# Patient Record
Sex: FEMALE | Race: WHITE | NOT HISPANIC OR LATINO | Employment: UNEMPLOYED | URBAN - METROPOLITAN AREA
[De-identification: names, ages, dates, MRNs, and addresses within clinical notes are randomized per-mention and may not be internally consistent; named-entity substitution may affect disease eponyms.]

---

## 2024-11-18 ENCOUNTER — APPOINTMENT (OUTPATIENT)
Dept: CT IMAGING | Facility: HOSPITAL | Age: 59
End: 2024-11-18
Payer: COMMERCIAL

## 2024-11-18 ENCOUNTER — HOSPITAL ENCOUNTER (OUTPATIENT)
Facility: HOSPITAL | Age: 59
Setting detail: OBSERVATION
Discharge: HOME | End: 2024-11-20
Attending: EMERGENCY MEDICINE | Admitting: INTERNAL MEDICINE
Payer: COMMERCIAL

## 2024-11-18 DIAGNOSIS — G44.201 ACUTE INTRACTABLE TENSION-TYPE HEADACHE: ICD-10-CM

## 2024-11-18 DIAGNOSIS — R11.2 NAUSEA AND VOMITING IN ADULT: ICD-10-CM

## 2024-11-18 DIAGNOSIS — E87.20 LACTIC ACIDEMIA: ICD-10-CM

## 2024-11-18 DIAGNOSIS — R00.0 SINUS TACHYCARDIA: Primary | ICD-10-CM

## 2024-11-18 LAB
ALBUMIN SERPL-MCNC: 4.8 G/DL (ref 3.5–5.2)
ALBUMIN/GLOB SERPL: 1.1 G/DL
ALP SERPL-CCNC: 175 U/L (ref 39–117)
ALT SERPL W P-5'-P-CCNC: 40 U/L (ref 1–33)
ANION GAP SERPL CALCULATED.3IONS-SCNC: 17 MMOL/L (ref 5–15)
AST SERPL-CCNC: 48 U/L (ref 1–32)
BASOPHILS # BLD AUTO: 0.04 10*3/MM3 (ref 0–0.2)
BASOPHILS NFR BLD AUTO: 0.2 % (ref 0–1.5)
BILIRUB SERPL-MCNC: 0.8 MG/DL (ref 0–1.2)
BUN SERPL-MCNC: 8 MG/DL (ref 6–20)
BUN/CREAT SERPL: 9.6 (ref 7–25)
CALCIUM SPEC-SCNC: 9.9 MG/DL (ref 8.6–10.5)
CHLORIDE SERPL-SCNC: 102 MMOL/L (ref 98–107)
CO2 SERPL-SCNC: 18 MMOL/L (ref 22–29)
CREAT SERPL-MCNC: 0.83 MG/DL (ref 0.57–1)
D-LACTATE SERPL-SCNC: 2.8 MMOL/L (ref 0.5–2)
DEPRECATED RDW RBC AUTO: 39.8 FL (ref 37–54)
EGFRCR SERPLBLD CKD-EPI 2021: 81.8 ML/MIN/1.73
EOSINOPHIL # BLD AUTO: 0 10*3/MM3 (ref 0–0.4)
EOSINOPHIL NFR BLD AUTO: 0 % (ref 0.3–6.2)
ERYTHROCYTE [DISTWIDTH] IN BLOOD BY AUTOMATED COUNT: 12.1 % (ref 12.3–15.4)
GLOBULIN UR ELPH-MCNC: 4.5 GM/DL
GLUCOSE SERPL-MCNC: 208 MG/DL (ref 65–99)
HCT VFR BLD AUTO: 48.8 % (ref 34–46.6)
HGB BLD-MCNC: 16.8 G/DL (ref 12–15.9)
HOLD SPECIMEN: NORMAL
IMM GRANULOCYTES # BLD AUTO: 0.08 10*3/MM3 (ref 0–0.05)
IMM GRANULOCYTES NFR BLD AUTO: 0.4 % (ref 0–0.5)
LIPASE SERPL-CCNC: 10 U/L (ref 13–60)
LYMPHOCYTES # BLD AUTO: 1.35 10*3/MM3 (ref 0.7–3.1)
LYMPHOCYTES NFR BLD AUTO: 7.3 % (ref 19.6–45.3)
MCH RBC QN AUTO: 30.4 PG (ref 26.6–33)
MCHC RBC AUTO-ENTMCNC: 34.4 G/DL (ref 31.5–35.7)
MCV RBC AUTO: 88.4 FL (ref 79–97)
MONOCYTES # BLD AUTO: 0.37 10*3/MM3 (ref 0.1–0.9)
MONOCYTES NFR BLD AUTO: 2 % (ref 5–12)
NEUTROPHILS NFR BLD AUTO: 16.53 10*3/MM3 (ref 1.7–7)
NEUTROPHILS NFR BLD AUTO: 90.1 % (ref 42.7–76)
NRBC BLD AUTO-RTO: 0 /100 WBC (ref 0–0.2)
PLATELET # BLD AUTO: 394 10*3/MM3 (ref 140–450)
PMV BLD AUTO: 8.8 FL (ref 6–12)
POTASSIUM SERPL-SCNC: 3.9 MMOL/L (ref 3.5–5.2)
PROT SERPL-MCNC: 9.3 G/DL (ref 6–8.5)
RBC # BLD AUTO: 5.52 10*6/MM3 (ref 3.77–5.28)
SODIUM SERPL-SCNC: 137 MMOL/L (ref 136–145)
WBC NRBC COR # BLD AUTO: 18.37 10*3/MM3 (ref 3.4–10.8)
WHOLE BLOOD HOLD COAG: NORMAL
WHOLE BLOOD HOLD SPECIMEN: NORMAL

## 2024-11-18 PROCEDURE — 96374 THER/PROPH/DIAG INJ IV PUSH: CPT

## 2024-11-18 PROCEDURE — 25010000002 KETOROLAC TROMETHAMINE PER 15 MG: Performed by: EMERGENCY MEDICINE

## 2024-11-18 PROCEDURE — 84484 ASSAY OF TROPONIN QUANT: CPT | Performed by: EMERGENCY MEDICINE

## 2024-11-18 PROCEDURE — 99285 EMERGENCY DEPT VISIT HI MDM: CPT

## 2024-11-18 PROCEDURE — 96375 TX/PRO/DX INJ NEW DRUG ADDON: CPT

## 2024-11-18 PROCEDURE — 25010000002 DIPHENHYDRAMINE PER 50 MG: Performed by: EMERGENCY MEDICINE

## 2024-11-18 PROCEDURE — 25810000003 LACTATED RINGERS SOLUTION: Performed by: EMERGENCY MEDICINE

## 2024-11-18 PROCEDURE — 84439 ASSAY OF FREE THYROXINE: CPT | Performed by: PHYSICIAN ASSISTANT

## 2024-11-18 PROCEDURE — 80050 GENERAL HEALTH PANEL: CPT | Performed by: PHYSICIAN ASSISTANT

## 2024-11-18 PROCEDURE — G0378 HOSPITAL OBSERVATION PER HR: HCPCS

## 2024-11-18 PROCEDURE — 83036 HEMOGLOBIN GLYCOSYLATED A1C: CPT | Performed by: PHYSICIAN ASSISTANT

## 2024-11-18 PROCEDURE — 83690 ASSAY OF LIPASE: CPT | Performed by: EMERGENCY MEDICINE

## 2024-11-18 PROCEDURE — 70450 CT HEAD/BRAIN W/O DYE: CPT

## 2024-11-18 PROCEDURE — 25010000002 METOCLOPRAMIDE PER 10 MG: Performed by: EMERGENCY MEDICINE

## 2024-11-18 PROCEDURE — 83605 ASSAY OF LACTIC ACID: CPT | Performed by: EMERGENCY MEDICINE

## 2024-11-18 PROCEDURE — 25010000002 ONDANSETRON PER 1 MG: Performed by: EMERGENCY MEDICINE

## 2024-11-18 RX ORDER — DIPHENHYDRAMINE HYDROCHLORIDE 50 MG/ML
25 INJECTION INTRAMUSCULAR; INTRAVENOUS ONCE
Status: COMPLETED | OUTPATIENT
Start: 2024-11-18 | End: 2024-11-18

## 2024-11-18 RX ORDER — SODIUM CHLORIDE 0.9 % (FLUSH) 0.9 %
10 SYRINGE (ML) INJECTION AS NEEDED
Status: DISCONTINUED | OUTPATIENT
Start: 2024-11-18 | End: 2024-11-20 | Stop reason: HOSPADM

## 2024-11-18 RX ORDER — ONDANSETRON 2 MG/ML
4 INJECTION INTRAMUSCULAR; INTRAVENOUS ONCE
Status: COMPLETED | OUTPATIENT
Start: 2024-11-18 | End: 2024-11-18

## 2024-11-18 RX ORDER — KETOROLAC TROMETHAMINE 15 MG/ML
15 INJECTION, SOLUTION INTRAMUSCULAR; INTRAVENOUS ONCE
Status: COMPLETED | OUTPATIENT
Start: 2024-11-18 | End: 2024-11-18

## 2024-11-18 RX ORDER — SODIUM CHLORIDE 9 MG/ML
10 INJECTION, SOLUTION INTRAMUSCULAR; INTRAVENOUS; SUBCUTANEOUS AS NEEDED
Status: DISCONTINUED | OUTPATIENT
Start: 2024-11-18 | End: 2024-11-20 | Stop reason: HOSPADM

## 2024-11-18 RX ORDER — METOCLOPRAMIDE HYDROCHLORIDE 5 MG/ML
10 INJECTION INTRAMUSCULAR; INTRAVENOUS ONCE
Status: COMPLETED | OUTPATIENT
Start: 2024-11-18 | End: 2024-11-18

## 2024-11-18 RX ADMIN — DIPHENHYDRAMINE HYDROCHLORIDE 25 MG: 50 INJECTION INTRAMUSCULAR; INTRAVENOUS at 22:10

## 2024-11-18 RX ADMIN — SODIUM CHLORIDE, POTASSIUM CHLORIDE, SODIUM LACTATE AND CALCIUM CHLORIDE 1000 ML: 600; 310; 30; 20 INJECTION, SOLUTION INTRAVENOUS at 22:12

## 2024-11-18 RX ADMIN — METOCLOPRAMIDE 10 MG: 5 INJECTION, SOLUTION INTRAMUSCULAR; INTRAVENOUS at 22:10

## 2024-11-18 RX ADMIN — ONDANSETRON 4 MG: 2 INJECTION INTRAMUSCULAR; INTRAVENOUS at 23:53

## 2024-11-18 RX ADMIN — KETOROLAC TROMETHAMINE 15 MG: 15 INJECTION, SOLUTION INTRAMUSCULAR; INTRAVENOUS at 22:10

## 2024-11-19 ENCOUNTER — APPOINTMENT (OUTPATIENT)
Dept: CT IMAGING | Facility: HOSPITAL | Age: 59
End: 2024-11-19
Payer: COMMERCIAL

## 2024-11-19 PROBLEM — K29.70 GASTRITIS: Status: ACTIVE | Noted: 2024-11-19

## 2024-11-19 PROBLEM — R11.2 INTRACTABLE NAUSEA AND VOMITING: Status: ACTIVE | Noted: 2024-11-19

## 2024-11-19 LAB
ANION GAP SERPL CALCULATED.3IONS-SCNC: 15 MMOL/L (ref 5–15)
B PARAPERT DNA SPEC QL NAA+PROBE: NOT DETECTED
B PERT DNA SPEC QL NAA+PROBE: NOT DETECTED
BACTERIA UR QL AUTO: ABNORMAL /HPF
BASOPHILS # BLD AUTO: 0.05 10*3/MM3 (ref 0–0.2)
BASOPHILS NFR BLD AUTO: 0.2 % (ref 0–1.5)
BILIRUB UR QL STRIP: NEGATIVE
BUN SERPL-MCNC: 8 MG/DL (ref 6–20)
BUN/CREAT SERPL: 11.6 (ref 7–25)
C PNEUM DNA NPH QL NAA+NON-PROBE: NOT DETECTED
CALCIUM SPEC-SCNC: 9.6 MG/DL (ref 8.6–10.5)
CHLORIDE SERPL-SCNC: 104 MMOL/L (ref 98–107)
CLARITY UR: ABNORMAL
CO2 SERPL-SCNC: 18 MMOL/L (ref 22–29)
COLOR UR: YELLOW
CREAT SERPL-MCNC: 0.69 MG/DL (ref 0.57–1)
D DIMER PPP FEU-MCNC: 0.32 MCGFEU/ML (ref 0–0.58)
D-LACTATE SERPL-SCNC: 1.8 MMOL/L (ref 0.5–2)
D-LACTATE SERPL-SCNC: 2.9 MMOL/L (ref 0.5–2)
DEPRECATED RDW RBC AUTO: 39 FL (ref 37–54)
EGFRCR SERPLBLD CKD-EPI 2021: 100.7 ML/MIN/1.73
EOSINOPHIL # BLD AUTO: 0 10*3/MM3 (ref 0–0.4)
EOSINOPHIL NFR BLD AUTO: 0 % (ref 0.3–6.2)
ERYTHROCYTE [DISTWIDTH] IN BLOOD BY AUTOMATED COUNT: 12 % (ref 12.3–15.4)
FLUAV SUBTYP SPEC NAA+PROBE: NOT DETECTED
FLUBV RNA ISLT QL NAA+PROBE: NOT DETECTED
GLUCOSE SERPL-MCNC: 152 MG/DL (ref 65–99)
GLUCOSE UR STRIP-MCNC: NEGATIVE MG/DL
HADV DNA SPEC NAA+PROBE: NOT DETECTED
HBA1C MFR BLD: 6.1 % (ref 4.8–5.6)
HCOV 229E RNA SPEC QL NAA+PROBE: NOT DETECTED
HCOV HKU1 RNA SPEC QL NAA+PROBE: NOT DETECTED
HCOV NL63 RNA SPEC QL NAA+PROBE: NOT DETECTED
HCOV OC43 RNA SPEC QL NAA+PROBE: NOT DETECTED
HCT VFR BLD AUTO: 45.3 % (ref 34–46.6)
HGB BLD-MCNC: 15.4 G/DL (ref 12–15.9)
HGB UR QL STRIP.AUTO: ABNORMAL
HMPV RNA NPH QL NAA+NON-PROBE: NOT DETECTED
HPIV1 RNA ISLT QL NAA+PROBE: NOT DETECTED
HPIV2 RNA SPEC QL NAA+PROBE: NOT DETECTED
HPIV3 RNA NPH QL NAA+PROBE: NOT DETECTED
HPIV4 P GENE NPH QL NAA+PROBE: NOT DETECTED
HYALINE CASTS UR QL AUTO: ABNORMAL /LPF
IMM GRANULOCYTES # BLD AUTO: 0.13 10*3/MM3 (ref 0–0.05)
IMM GRANULOCYTES NFR BLD AUTO: 0.6 % (ref 0–0.5)
KETONES UR QL STRIP: NEGATIVE
LEUKOCYTE ESTERASE UR QL STRIP.AUTO: NEGATIVE
LYMPHOCYTES # BLD AUTO: 1.79 10*3/MM3 (ref 0.7–3.1)
LYMPHOCYTES NFR BLD AUTO: 8.2 % (ref 19.6–45.3)
M PNEUMO IGG SER IA-ACNC: NOT DETECTED
MCH RBC QN AUTO: 30.2 PG (ref 26.6–33)
MCHC RBC AUTO-ENTMCNC: 34 G/DL (ref 31.5–35.7)
MCV RBC AUTO: 88.8 FL (ref 79–97)
MONOCYTES # BLD AUTO: 0.94 10*3/MM3 (ref 0.1–0.9)
MONOCYTES NFR BLD AUTO: 4.3 % (ref 5–12)
NEUTROPHILS NFR BLD AUTO: 19.02 10*3/MM3 (ref 1.7–7)
NEUTROPHILS NFR BLD AUTO: 86.7 % (ref 42.7–76)
NITRITE UR QL STRIP: NEGATIVE
NRBC BLD AUTO-RTO: 0 /100 WBC (ref 0–0.2)
PH UR STRIP.AUTO: 8.5 [PH] (ref 5–8)
PLATELET # BLD AUTO: 373 10*3/MM3 (ref 140–450)
PMV BLD AUTO: 8.7 FL (ref 6–12)
POTASSIUM SERPL-SCNC: 4.3 MMOL/L (ref 3.5–5.2)
PROT UR QL STRIP: NEGATIVE
RBC # BLD AUTO: 5.1 10*6/MM3 (ref 3.77–5.28)
RBC # UR STRIP: ABNORMAL /HPF
REF LAB TEST METHOD: ABNORMAL
RHINOVIRUS RNA SPEC NAA+PROBE: NOT DETECTED
RSV RNA NPH QL NAA+NON-PROBE: NOT DETECTED
SARS-COV-2 RNA NPH QL NAA+NON-PROBE: NOT DETECTED
SODIUM SERPL-SCNC: 137 MMOL/L (ref 136–145)
SP GR UR STRIP: 1.01 (ref 1–1.03)
SQUAMOUS #/AREA URNS HPF: ABNORMAL /HPF
T4 FREE SERPL-MCNC: 1.29 NG/DL (ref 0.92–1.68)
TROPONIN T SERPL HS-MCNC: <6 NG/L
TSH SERPL DL<=0.05 MIU/L-ACNC: 0.6 UIU/ML (ref 0.27–4.2)
UROBILINOGEN UR QL STRIP: ABNORMAL
WBC # UR STRIP: ABNORMAL /HPF
WBC NRBC COR # BLD AUTO: 21.93 10*3/MM3 (ref 3.4–10.8)

## 2024-11-19 PROCEDURE — 0202U NFCT DS 22 TRGT SARS-COV-2: CPT | Performed by: PHYSICIAN ASSISTANT

## 2024-11-19 PROCEDURE — 25810000003 SODIUM CHLORIDE 0.9 % SOLUTION: Performed by: PHYSICIAN ASSISTANT

## 2024-11-19 PROCEDURE — 99222 1ST HOSP IP/OBS MODERATE 55: CPT | Performed by: INTERNAL MEDICINE

## 2024-11-19 PROCEDURE — 83605 ASSAY OF LACTIC ACID: CPT | Performed by: EMERGENCY MEDICINE

## 2024-11-19 PROCEDURE — G0378 HOSPITAL OBSERVATION PER HR: HCPCS

## 2024-11-19 PROCEDURE — 80048 BASIC METABOLIC PNL TOTAL CA: CPT | Performed by: PHYSICIAN ASSISTANT

## 2024-11-19 PROCEDURE — 85025 COMPLETE CBC W/AUTO DIFF WBC: CPT | Performed by: PHYSICIAN ASSISTANT

## 2024-11-19 PROCEDURE — 81001 URINALYSIS AUTO W/SCOPE: CPT | Performed by: EMERGENCY MEDICINE

## 2024-11-19 PROCEDURE — 25010000002 KETOROLAC TROMETHAMINE PER 15 MG: Performed by: INTERNAL MEDICINE

## 2024-11-19 PROCEDURE — 25810000003 LACTATED RINGERS SOLUTION: Performed by: EMERGENCY MEDICINE

## 2024-11-19 PROCEDURE — 93005 ELECTROCARDIOGRAM TRACING: CPT | Performed by: EMERGENCY MEDICINE

## 2024-11-19 PROCEDURE — 25010000002 ONDANSETRON PER 1 MG: Performed by: PHYSICIAN ASSISTANT

## 2024-11-19 PROCEDURE — 36415 COLL VENOUS BLD VENIPUNCTURE: CPT

## 2024-11-19 PROCEDURE — 85379 FIBRIN DEGRADATION QUANT: CPT | Performed by: EMERGENCY MEDICINE

## 2024-11-19 PROCEDURE — 96376 TX/PRO/DX INJ SAME DRUG ADON: CPT

## 2024-11-19 PROCEDURE — 96361 HYDRATE IV INFUSION ADD-ON: CPT

## 2024-11-19 PROCEDURE — 25510000001 IOPAMIDOL 61 % SOLUTION: Performed by: EMERGENCY MEDICINE

## 2024-11-19 PROCEDURE — 74177 CT ABD & PELVIS W/CONTRAST: CPT

## 2024-11-19 PROCEDURE — 96375 TX/PRO/DX INJ NEW DRUG ADDON: CPT

## 2024-11-19 RX ORDER — SODIUM CHLORIDE 0.9 % (FLUSH) 0.9 %
10 SYRINGE (ML) INJECTION AS NEEDED
Status: DISCONTINUED | OUTPATIENT
Start: 2024-11-19 | End: 2024-11-20 | Stop reason: HOSPADM

## 2024-11-19 RX ORDER — DIFLUPREDNATE OPHTHALMIC 0.5 MG/ML
1 EMULSION OPHTHALMIC 4 TIMES DAILY
Status: DISCONTINUED | OUTPATIENT
Start: 2024-11-19 | End: 2024-11-20 | Stop reason: HOSPADM

## 2024-11-19 RX ORDER — DIFLUPREDNATE OPHTHALMIC 0.5 MG/ML
1 EMULSION OPHTHALMIC 4 TIMES DAILY
COMMUNITY
Start: 2024-11-18 | End: 2024-12-09

## 2024-11-19 RX ORDER — PREDNISOLONE PHOSPHATE - GATIFLOXACIN - BROMFENAC 10; 5; .75 MG/ML; MG/ML; MG/ML
1 SOLUTION/ DROPS OPHTHALMIC 4 TIMES DAILY
Status: ON HOLD | COMMUNITY
End: 2024-11-19

## 2024-11-19 RX ORDER — PREDNISOLONE SOD PH/MOXIFLOX 1 %-0.5 %
1 DROPS OPHTHALMIC (EYE) 4 TIMES DAILY
Status: DISCONTINUED | OUTPATIENT
Start: 2024-11-19 | End: 2024-11-19

## 2024-11-19 RX ORDER — SODIUM CHLORIDE 0.9 % (FLUSH) 0.9 %
10 SYRINGE (ML) INJECTION EVERY 12 HOURS SCHEDULED
Status: DISCONTINUED | OUTPATIENT
Start: 2024-11-19 | End: 2024-11-20 | Stop reason: HOSPADM

## 2024-11-19 RX ORDER — PREDNISOLN SP/MOXIFLOX/BROMFEN 1 %-0.5 %
1 DROPS OPHTHALMIC (EYE) 4 TIMES DAILY
Status: DISCONTINUED | OUTPATIENT
Start: 2024-11-19 | End: 2024-11-20 | Stop reason: HOSPADM

## 2024-11-19 RX ORDER — ONDANSETRON 4 MG/1
4 TABLET, ORALLY DISINTEGRATING ORAL EVERY 6 HOURS PRN
Status: DISCONTINUED | OUTPATIENT
Start: 2024-11-19 | End: 2024-11-20 | Stop reason: HOSPADM

## 2024-11-19 RX ORDER — IOPAMIDOL 612 MG/ML
100 INJECTION, SOLUTION INTRAVASCULAR
Status: COMPLETED | OUTPATIENT
Start: 2024-11-19 | End: 2024-11-19

## 2024-11-19 RX ORDER — ONDANSETRON 2 MG/ML
4 INJECTION INTRAMUSCULAR; INTRAVENOUS EVERY 6 HOURS PRN
Status: DISCONTINUED | OUTPATIENT
Start: 2024-11-19 | End: 2024-11-20 | Stop reason: HOSPADM

## 2024-11-19 RX ORDER — SODIUM CHLORIDE 9 MG/ML
40 INJECTION, SOLUTION INTRAVENOUS AS NEEDED
Status: DISCONTINUED | OUTPATIENT
Start: 2024-11-19 | End: 2024-11-20 | Stop reason: HOSPADM

## 2024-11-19 RX ORDER — SODIUM CHLORIDE 9 MG/ML
100 INJECTION, SOLUTION INTRAVENOUS CONTINUOUS
Status: DISCONTINUED | OUTPATIENT
Start: 2024-11-19 | End: 2024-11-20 | Stop reason: HOSPADM

## 2024-11-19 RX ORDER — PANTOPRAZOLE SODIUM 40 MG/10ML
40 INJECTION, POWDER, LYOPHILIZED, FOR SOLUTION INTRAVENOUS
Status: DISCONTINUED | OUTPATIENT
Start: 2024-11-19 | End: 2024-11-20 | Stop reason: HOSPADM

## 2024-11-19 RX ORDER — ACETAMINOPHEN 325 MG/1
650 TABLET ORAL EVERY 4 HOURS PRN
Status: DISCONTINUED | OUTPATIENT
Start: 2024-11-19 | End: 2024-11-20 | Stop reason: HOSPADM

## 2024-11-19 RX ORDER — POLYETHYLENE GLYCOL 3350 17 G/17G
17 POWDER, FOR SOLUTION ORAL DAILY
Status: DISCONTINUED | OUTPATIENT
Start: 2024-11-19 | End: 2024-11-20 | Stop reason: HOSPADM

## 2024-11-19 RX ORDER — PREDNISOLONE SOD PH/MOXIFLOX 1 %-0.5 %
1 DROPS OPHTHALMIC (EYE) EVERY 6 HOURS
Status: ON HOLD | COMMUNITY
End: 2024-11-19

## 2024-11-19 RX ORDER — NITROGLYCERIN 0.4 MG/1
0.4 TABLET SUBLINGUAL
Status: DISCONTINUED | OUTPATIENT
Start: 2024-11-19 | End: 2024-11-20 | Stop reason: HOSPADM

## 2024-11-19 RX ORDER — KETOROLAC TROMETHAMINE 15 MG/ML
15 INJECTION, SOLUTION INTRAMUSCULAR; INTRAVENOUS ONCE AS NEEDED
Status: COMPLETED | OUTPATIENT
Start: 2024-11-19 | End: 2024-11-19

## 2024-11-19 RX ORDER — ACETAMINOPHEN 160 MG/5ML
650 SOLUTION ORAL EVERY 4 HOURS PRN
Status: DISCONTINUED | OUTPATIENT
Start: 2024-11-19 | End: 2024-11-20 | Stop reason: HOSPADM

## 2024-11-19 RX ORDER — LACTULOSE 10 G/15ML
30 SOLUTION ORAL DAILY
Status: DISCONTINUED | OUTPATIENT
Start: 2024-11-19 | End: 2024-11-20 | Stop reason: HOSPADM

## 2024-11-19 RX ORDER — ACETAMINOPHEN 650 MG/1
650 SUPPOSITORY RECTAL EVERY 4 HOURS PRN
Status: DISCONTINUED | OUTPATIENT
Start: 2024-11-19 | End: 2024-11-20 | Stop reason: HOSPADM

## 2024-11-19 RX ORDER — PREDNISOLN SP/MOXIFLOX/BROMFEN 1 %-0.5 %
1 DROPS OPHTHALMIC (EYE) 4 TIMES DAILY
COMMUNITY

## 2024-11-19 RX ADMIN — IOPAMIDOL 75 ML: 612 INJECTION, SOLUTION INTRAVENOUS at 01:33

## 2024-11-19 RX ADMIN — KETOROLAC TROMETHAMINE 15 MG: 15 INJECTION, SOLUTION INTRAMUSCULAR; INTRAVENOUS at 13:49

## 2024-11-19 RX ADMIN — PANTOPRAZOLE SODIUM 40 MG: 40 INJECTION, POWDER, FOR SOLUTION INTRAVENOUS at 16:31

## 2024-11-19 RX ADMIN — LACTULOSE 30 G: 20 SOLUTION ORAL at 08:43

## 2024-11-19 RX ADMIN — Medication 1 DROP: at 18:36

## 2024-11-19 RX ADMIN — SODIUM CHLORIDE, POTASSIUM CHLORIDE, SODIUM LACTATE AND CALCIUM CHLORIDE 1000 ML: 600; 310; 30; 20 INJECTION, SOLUTION INTRAVENOUS at 00:55

## 2024-11-19 RX ADMIN — SODIUM CHLORIDE 100 ML/HR: 9 INJECTION, SOLUTION INTRAVENOUS at 16:31

## 2024-11-19 RX ADMIN — ONDANSETRON 4 MG: 2 INJECTION INTRAMUSCULAR; INTRAVENOUS at 05:36

## 2024-11-19 RX ADMIN — Medication 10 ML: at 08:43

## 2024-11-19 RX ADMIN — Medication 1 DROP: at 20:38

## 2024-11-19 RX ADMIN — Medication 1 DROP: at 13:01

## 2024-11-19 RX ADMIN — POLYETHYLENE GLYCOL 3350 17 G: 17 POWDER, FOR SOLUTION ORAL at 08:43

## 2024-11-19 RX ADMIN — ONDANSETRON 4 MG: 2 INJECTION INTRAMUSCULAR; INTRAVENOUS at 12:58

## 2024-11-19 RX ADMIN — SODIUM CHLORIDE 100 ML/HR: 9 INJECTION, SOLUTION INTRAVENOUS at 05:39

## 2024-11-19 NOTE — H&P
[facility] Medicine Services  HISTORY AND PHYSICAL    Patient Name: Faye Villarreal  : 1965  MRN: [id number]  Primary Care Physician: Provider, No Known  Date of admission: 2024    Subjective   Subjective     Chief Complaint:  Nausea/vomiting      HPI:  Faye Villarreal is a 59 y.o. female with a medical history only significant for recent cataract repair who presents to [facility] ED for complaint of intractable nausea/vomiting and headache. She reports that she underwent cataract repair earlier today. She states that after coming off of the light anesthesia used in the surgery, she became nauseous and has thrown up multiple times since leaving their office. She denies fever, chills, chest pain, abdominal pain, or diarrhea. She is a non-smoker, and denies alcohol abuse or illegal drug use.         Review of Systems   Constitutional:  Negative for chills, fatigue, fever and unexpected weight change.   HENT:  Negative for nosebleeds, sore throat and trouble swallowing.    Eyes:  Negative for photophobia and visual disturbance.   Respiratory:  Negative for cough, shortness of breath and wheezing.    Cardiovascular:  Negative for chest pain and palpitations.   Gastrointestinal:  Positive for nausea and vomiting. Negative for blood in stool and diarrhea.   Genitourinary:  Negative for dysuria and hematuria.   Musculoskeletal:  Negative for arthralgias and myalgias.   Skin: Negative.    Neurological:  Positive for headaches. Negative for speech difficulty and weakness.   Psychiatric/Behavioral:  Negative for confusion. The patient is not nervous/anxious.               Personal History     No past medical history on file.          No past surgical history on file.    Family History:  family history includes Dementia in her father; Diabetes in her mother; Stroke in her father.     Social History:    Social History     Social History Narrative    Not on file       Medications:        Allergies   Allergen Reactions    Bactrim [Sulfamethoxazole-Trimethoprim] Unknown - Low Severity       Objective   Objective     Vital Signs:   Temp:  [98.3 °F (36.8 °C)] 98.3 °F (36.8 °C)  Heart Rate:  [120] 120  Resp:  [16-18] 16  BP: (135-139)/(75-79) 139/75    Physical Exam  Constitutional:       General: She is not in acute distress.     Appearance: Normal appearance. She is obese.   HENT:      Head: Atraumatic.      Right Ear: External ear normal.      Left Ear: External ear normal.      Nose: Nose normal.   Eyes:      Extraocular Movements: Extraocular movements intact.      Conjunctiva/sclera: Conjunctivae normal.      Pupils: Pupils are equal, round, and reactive to light.   Cardiovascular:      Rate and Rhythm: Regular rhythm. Tachycardia present.      Pulses: Normal pulses.      Heart sounds: Normal heart sounds. No murmur heard.  Pulmonary:      Effort: Pulmonary effort is normal. No respiratory distress.      Breath sounds: Normal breath sounds. No wheezing, rhonchi or rales.   Abdominal:      General: Bowel sounds are normal. There is no distension.      Tenderness: There is no abdominal tenderness. There is no guarding or rebound.   Musculoskeletal:         General: Normal range of motion.      Cervical back: No rigidity.      Right lower leg: No edema.      Left lower leg: No edema.   Skin:     General: Skin is warm and dry.      Coloration: Skin is not jaundiced.      Findings: No lesion or rash.   Neurological:      General: No focal deficit present.      Mental Status: She is alert and oriented to person, place, and time.   Psychiatric:         Attention and Perception: Attention normal.         Mood and Affect: Mood normal.         Behavior: Behavior normal.         Thought Content: Thought content normal.          Result Review:  I have personally reviewed the results from the time of this admission to 11/19/2024 05:07 EST and agree with these findings:  [x]  Laboratory list / accordion  [x]   Microbiology  [x]  Radiology  [x]  EKG/Telemetry   []  Cardiology/Vascular   []  Pathology  [x]  Old records  []  Other:      LAB RESULTS:      Lab 11/19/24  0338 11/19/24  0204 11/18/24 2204   WBC  --   --  18.37*   HEMOGLOBIN  --   --  16.8*   HEMATOCRIT  --   --  48.8*   PLATELETS  --   --  394   NEUTROS ABS  --   --  16.53*   IMMATURE GRANS (ABS)  --   --  0.08*   LYMPHS ABS  --   --  1.35   MONOS ABS  --   --  0.37   EOS ABS  --   --  0.00   MCV  --   --  88.4   LACTATE 1.8 2.9* 2.8*   D DIMER QUANT 0.32  --   --          Lab 11/18/24 2204   SODIUM 137   POTASSIUM 3.9   CHLORIDE 102   CO2 18.0*   ANION GAP 17.0*   BUN 8   CREATININE 0.83   EGFR 81.8   GLUCOSE 208*   CALCIUM 9.9   TSH 0.597         Lab 11/18/24 2204   TOTAL PROTEIN 9.3*   ALBUMIN 4.8   GLOBULIN 4.5   ALT (SGPT) 40*   AST (SGOT) 48*   BILIRUBIN 0.8   ALK PHOS 175*   LIPASE 10*         Lab 11/18/24 2204   HSTROP T <6                 Brief Urine Lab Results  (Last result in the past 365 days)        Color   Clarity   Blood   Leuk Est   Nitrite   Protein   CREAT   Urine HCG        11/19/24 0110 Yellow   Turbid   Moderate (2+)   Negative   Negative   Negative                 Microbiology Results (last 10 days)       ** No results found for the last 240 hours. **            CT Abdomen Pelvis With Contrast    Result Date: 11/19/2024  CT ABDOMEN PELVIS W CONTRAST Date of Exam: 11/19/2024 1:18 AM EST Indication: upper abdominal pain, nausea and vomiting. Comparison: None available. Technique: Axial CT images were obtained of the abdomen and pelvis following the uneventful intravenous administration of intravenous contrast. Reconstructed coronal and sagittal images were also obtained. Automated exposure control and iterative construction methods were used. Findings: Lung Bases:   The visualized lung bases and lower mediastinal structures are unremarkable. Liver: Liver is normal in size and CT density. No focal lesions. Biliary/Gallbladder:  The  gallbladder has been resected.. There is enlargement of the intrahepatic and extrahepatic biliary tree with no evidence of filling defect. The common bile duct measures up to 8 mm. This may be related to postcholecystectomy status. Spleen: Spleen is normal in size and CT density. Pancreas:  The pancreas appears atrophic.. There is no evidence of pancreatic mass or peripancreatic fluid. Kidneys:  Kidneys are normal in size. There are no stones or hydronephrosis. Adrenals:  Adrenal glands are unremarkable. Retroperitoneal/Lymph Nodes/Vasculature:  No retroperitoneal adenopathy is identified. Gastrointestinal/Mesentery:  Diffuse stomach wall thickening is present. Moderate to large stool burden present. No evidence of hernia. The bowel loops are non-dilated without wall thickening or mass. The appendix has been resected.. No evidence of obstruction. No free air. No mesenteric fluid collections identified. Bladder:  The bladder is normal. Genital:   Unremarkable       Bony Structures:   Visualized bony structures are consistent with the patient's age.     Impression: Impression: 1.Diffuse stomach wall thickening likely related to gastritis. 2.Prominence of the intrahepatic and extrahepatic biliary tree. No definite filling defects identified. Findings may be related to postcholecystectomy status. Recommend clinical correlation. 3.Moderate to large stool burden likely related to constipation. 4.Ancillary findings as described above. Electronically Signed: Darya Reynolds MD  11/19/2024 1:43 AM EST  Workstation ID: [id number]    CT Head Without Contrast    Result Date: 11/18/2024  CT HEAD WO CONTRAST Date of Exam: 11/18/2024 11:00 PM EST Indication: headache, vomiting. Comparison: None available. Technique: Axial CT images were obtained of the head without contrast administration.  Automated exposure control and iterative construction methods were used. Findings: There is no evidence of hemorrhage. There is no mass effect or  midline shift. There is no extracerebral collection. Ventricles are normal in size and configuration for patient's stated age.  Posterior fossa is within normal limits. Calvarium and skull base appear intact.   Visualized sinuses show no air fluid levels. Visualized orbits are unremarkable.     Impression: Impression: No acute intracranial process. Electronically Signed: Darya Reynolds MD  11/18/2024 11:06 PM EST  Workstation ID: [id number]         Assessment & Plan   Assessment & Plan       Gastritis    Intractable nausea and vomiting      Faye Villarreal is a 59 y.o. female with a medical history only significant for recent cataract repair who presents to [facility] ED for complaint of intractable nausea/vomiting and headache. She reports that she underwent cataract repair earlier today.       Intractable Nausea/vomiting  Gastritis  Post-anesthesia related?  -CT head unremarkable  -CT abd/pelvis tonight shows findings suggestive of gastritis  -UA unremarkable  -Leukocytosis 18.37. Lactate 2.9 (normalized after IV fluids)  -Resp panel PCR pending  -Zofran and compazine for nausea        DVT prophylaxis:  mechanical    CODE STATUS:  Full Code  Code Status (Patient has no pulse and is not breathing): CPR (Attempt to Resuscitate)  Medical Interventions (Patient has pulse or is breathing): Full Support      Expected Discharge 1-2 days     This note has been completed as part of a split-shared workflow.     Signature: Electronically signed by Chris Uribe PA-C, 11/19/24, 4:54 AM EST

## 2024-11-19 NOTE — CASE MANAGEMENT/SOCIAL WORK
Discharge Planning Assessment  [facility]     Patient Name: Faye Villarreal  MRN: [id number]  Today's Date: 11/19/2024    Admit Date: 11/18/2024    Plan: Home   Discharge Needs Assessment       Row Name 11/19/24 1021       Living Environment    People in Home spouse    Name(s) of People in Home Darek Villarreal- [phone]    Current Living Arrangements home    Primary Care Provided by self    Provides Primary Care For no one    Family Caregiver if Needed spouse    Family Caregiver Names Draek Pablo [phone]    Quality of Family Relationships helpful;involved;supportive    Able to Return to Prior Arrangements yes       Transition Planning    Patient/Family Anticipates Transition to home with family    Patient/Family Anticipated Services at Transition     Transportation Anticipated family or friend will provide       Discharge Needs Assessment    Equipment Currently Used at Home none                   Discharge Plan       Row Name 11/19/24 1023       Plan    Plan Home    Patient/Family in Agreement with Plan yes    Plan Comments Met with Ms. Villarreal and her  in her room, to initiate discharge planning. She lives with her  in [facility]. She verified she has [place] [employer]. She has prescription coverage and uses [facility] in [place] to get her prescriptions filled. She has no PCP and would like one in the [place] area at discharge. Prior to admission, she was independent with ADL's. She uses no medical equipment at home and she is not current with home health. Her plan is to go home, when she is medically ready for discharge. Family will transport. CM will continue to follow for medical readiness and will assist with any discharge needs as indicated.    Final Discharge Disposition Code 01 - home or self-care                  Continued Care and Services - Admitted Since 11/18/2024    No active coordination exists for this encounter.       Expected  Discharge Date and Time       Expected Discharge Date Expected Discharge Time    Nov 20, 2024            Demographic Summary       Row Name 11/19/24 1020       General Information    Admission Type observation    Arrived From emergency department    Referral Source admission list    Reason for Consult discharge planning    Preferred Language English       Contact Information    Permission Granted to Share Info With     Contact Information Obtained for                    Functional Status       Row Name 11/19/24 1021       Functional Status    Usual Activity Tolerance moderate    Current Activity Tolerance moderate       Functional Status, IADL    Medications independent    Meal Preparation independent    Housekeeping independent    Laundry independent    Shopping independent                   Psychosocial    No documentation.                  Abuse/Neglect    No documentation.                  Legal    No documentation.                  Substance Abuse    No documentation.                  Patient Forms    No documentation.                     Marycarmen Cardona RN

## 2024-11-19 NOTE — ED PROVIDER NOTES
[facility]    EMERGENCY DEPARTMENT ENCOUNTER      Pt Name: Faye Villarreal  MRN: [id number]  YOB: 1965  Date of evaluation: 11/18/2024  Provider: Kemar Germain MD    CHIEF COMPLAINT       Chief Complaint   Patient presents with    Vomiting         HISTORY OF PRESENT ILLNESS   Faye Villarreal is a 59 y.o. female who presents to the emergency department for evaluation of headache nausea and vomiting.  Patient states she underwent cataract surgery a little afternoon today.  She was feeling fine other than a little bit of preprocedural anxiety before the procedure.  Afterwards when they sat her up from the procedure table she developed nausea, vomiting, and an acute onset but not thunderclap headache.  Patient states that it is global headache, nonradiating, no identifiable aggravating or alleviating factors other than it increases in severity when she vomits.  She has not had fevers or chills.    She does get occasional headaches but it is not a regular occurrence for her.  She is not having any numbness, weakness, neck pain or stiffness.  No abdominal pain.    Patient reports that she did receive medications through an IV during the procedure but she is not sure what medicines were given to her.  She states she is conscious during the whole procedure.    REVIEW OF SYSTEMS     ROS:  A chief complaint appropriate review of systems was completed and is negative except as noted in the HPI.      PAST MEDICAL HISTORY   No past medical history on file.      SURGICAL HISTORY     No past surgical history on file.      CURRENT MEDICATIONS       Current Facility-Administered Medications:     promethazine (PHENERGAN) 12.5 mg in sodium chloride 0.9 % 50 mL, 12.5 mg, Intravenous, Q6H PRN, Kemar Germain MD    Sodium Chloride (PF) 0.9 % 10 mL, 10 mL, Intravenous, PRN, Kemar Germain MD    [COMPLETED] Insert Peripheral IV, , , Once **AND** sodium chloride 0.9 % flush 10 mL, 10 mL, Intravenous, PRN, Kemar Germain MD  No current  "outpatient medications on file.    ALLERGIES     Bactrim [sulfamethoxazole-trimethoprim]    FAMILY HISTORY     No family history on file.       SOCIAL HISTORY       Social History     Socioeconomic History    Marital status: Single         PHYSICAL EXAM    (up to 7 for level 4, 8 or more for level 5)     Vitals:    11/18/24 2143 11/19/24 0042   BP: 135/79 139/75   BP Location: Left arm    Patient Position: Sitting    Pulse: 120 120   Resp: 18 16   Temp: 98.3 °F (36.8 °C)    TempSrc: Oral    SpO2: 94% 94%   Weight: 79.4 kg (175 lb)    Height: 160 cm (63\")        Physical Exam  Constitutional:       General: She is not in acute distress.  HENT:      Head: Normocephalic and atraumatic.   Eyes:      Conjunctiva/sclera: Conjunctivae normal.      Comments: Right pupil is round equal and reactive to light.  Left pupil is fixed and dilated.  There is an eye shield covering the left eye   Cardiovascular:      Rate and Rhythm: Normal rate and regular rhythm.      Pulses: Normal pulses.      Heart sounds: No murmur heard.     No gallop.   Pulmonary:      Effort: Pulmonary effort is normal. No respiratory distress.   Abdominal:      General: Abdomen is flat. There is no distension.      Tenderness: There is no abdominal tenderness.   Musculoskeletal:         General: No swelling or deformity. Normal range of motion.   Skin:     General: Skin is warm and dry.      Capillary Refill: Capillary refill takes less than 2 seconds.   Neurological:      General: No focal deficit present.      Mental Status: She is alert and oriented to person, place, and time.      Comments: GCS 15.  Cranial Nerves II-XII intact without deficit.  Strength 5/5 in the bilateral upper extremities.  Strength 5/5 in the bilateral lower extremities.  Sensation to light touch intact throughout.  Cerebellar function intact via finger-nose-finger.     Psychiatric:         Mood and Affect: Mood normal.         Behavior: Behavior normal.            DIAGNOSTIC " RESULTS     EKG: All EKGs are interpreted by the Emergency Department Physician who either signs or Co-signs this chart in the absence of a cardiologist.    ECG 12 Lead Tachycardia   Preliminary Result   Test Reason : Tachycardia   Blood Pressure :   */*   mmHG   Vent. Rate : 120 BPM     Atrial Rate : 120 BPM      P-R Int : 154 ms          QRS Dur :  86 ms       QT Int : 316 ms       P-R-T Axes :  46  36  47 degrees     QTcB Int : 446 ms      Sinus tachycardia   Nonspecific ST abnormality   Abnormal ECG   No previous ECGs available      Referred By: ED MD           Confirmed By:             RADIOLOGY:   [x] Radiologist's Report Reviewed:  CT Abdomen Pelvis With Contrast   Final Result   Impression:   1.Diffuse stomach wall thickening likely related to gastritis.   2.Prominence of the intrahepatic and extrahepatic biliary tree. No definite filling defects identified. Findings may be related to postcholecystectomy status. Recommend clinical correlation.   3.Moderate to large stool burden likely related to constipation.   4.Ancillary findings as described above.            Electronically Signed: Darya Reynolds MD     11/19/2024 1:43 AM EST     Workstation ID: [id number]      CT Head Without Contrast   Final Result   Impression:   No acute intracranial process.            Electronically Signed: Darya Reynolds MD     11/18/2024 11:06 PM EST     Workstation ID: [id number]          I ordered and independently reviewed the above noted radiographic studies.        LABS:  I independently interpreted all laboratory studies conducted during this ED visit.  The results of these studies can be seen below and my independent interpretation in the ED course      EMERGENCY DEPARTMENT COURSE and DIFFERENTIAL DIAGNOSIS/MDM:   Vitals:  AS OF 04:14 EST    BP - 139/75  HR - 120  TEMP - 98.3 °F (36.8 °C) (Oral)  O2 SATS - 94%        Discussion below represents my analysis of pertinent findings related to patient's condition, differential  diagnosis, treatment plan and final disposition.      Differential diagnosis:  The differential diagnosis associated with the patient's presentation includes: Acute tension or migraine headache, subarachnoid hemorrhage, intracranial mass, adverse effect of medications given during procedural intervention      Independent interpretations (ECG/rhythm strip/X-ray/US/CT scan): See ED course      Additional sources:  Discussed/obtained information from independent historians:   [x] Spouse: Patient spouse provides some details of HPI   [] Parent:   [] Friend:   [] EMS:   [] Other:      Patient's care impacted by:   [] Diabetes   [] Hypertension   [] Coronary Artery Disease   [] Cancer   [] Other:     Care significantly affected by Social Determinants of Health (housing and economic circumstances, unemployment)    [] Yes     [x] No   If yes, Patient's care significantly limited by  Social Determinants of Health including:    [] Inadequate housing    [] Low income    [] Alcoholism and drug addiction in family    [] Problems related to primary support group    [] Unemployment    [] Problems related to employment    [] Other Social Determinants of Health:       ED Course:    ED Course as of 11/19/24 0414   Mon Nov 18, 2024   2334 CT scan of the brain independently interpreted by myself demonstrates no acute intracranial abnormality [KB]   Tue Nov 19, 2024   0113 Twelve-lead ECG independently interpreted by myself demonstrates sinus tachycardia with a rate of 128, no ST segment elevation or depression. [KB]   0206 CT scan of the abdomen and pelvis independently interpreted by myself demonstrates evidence of gastritis, biliary ductal dilation without occlusion suspected to be related to cholecystectomy [KB]   0411 Laboratory workup independently interpreted by myself notable for leukocytosis, lactic acidemia, hematuria [KB]      ED Course User Index  [KB] Kemar Germain MD         Patient was reevaluated on multiple occasions  in the emergency department.  She experiences some temporary relief of her headache and nausea but then they returned more severe again.    Definitive diagnosis is not able to be reached and there concerning factors including persistent lactic acidemia, persistent tachycardia despite fluid resuscitation that make me worried to discharge the patient home at this time.  Will plan to admit to hospital medicine for further diagnostic clarification and treatment.      PROCEDURES:  Procedures      FINAL IMPRESSION      1. Sinus tachycardia    2. Lactic acidemia    3. Nausea and vomiting in adult    4. Acute intractable tension-type headache          DISPOSITION/PLAN     ED Disposition       ED Disposition   Decision to Admit    Condition   --    Comment   Level of Care: Telemetry [5]   Diagnosis: Gastritis [208116]   Admitting Physician: SALMA GREGORY [[id number]]   Attending Physician: SALMA GREGORY [[id number]]                   Comment: Please note this report has been produced using speech recognition software.      Kemar Germain MD  Attending Emergency Physician    Recent Results (from the past 24 hours)   Comprehensive Metabolic Panel    Collection Time: 11/18/24 10:04 PM    Specimen: Arm, Left; Blood   Result Value Ref Range    Glucose 208 (H) 65 - 99 mg/dL    BUN 8 6 - 20 mg/dL    Creatinine 0.83 0.57 - 1.00 mg/dL    Sodium 137 136 - 145 mmol/L    Potassium 3.9 3.5 - 5.2 mmol/L    Chloride 102 98 - 107 mmol/L    CO2 18.0 (L) 22.0 - 29.0 mmol/L    Calcium 9.9 8.6 - 10.5 mg/dL    Total Protein 9.3 (H) 6.0 - 8.5 g/dL    Albumin 4.8 3.5 - 5.2 g/dL    ALT (SGPT) 40 (H) 1 - 33 U/L    AST (SGOT) 48 (H) 1 - 32 U/L    Alkaline Phosphatase 175 (H) 39 - 117 U/L    Total Bilirubin 0.8 0.0 - 1.2 mg/dL    Globulin 4.5 gm/dL    A/G Ratio 1.1 g/dL    BUN/Creatinine Ratio 9.6 7.0 - 25.0    Anion Gap 17.0 (H) 5.0 - 15.0 mmol/L    eGFR 81.8 >60.0 mL/min/1.73   Lipase    Collection Time: 11/18/24 10:04 PM    Specimen: Arm, Left; Blood   Result  Value Ref Range    Lipase 10 (L) 13 - 60 U/L   Lactic Acid, Plasma    Collection Time: 11/18/24 10:04 PM    Specimen: Arm, Left; Blood   Result Value Ref Range    Lactate 2.8 (C) 0.5 - 2.0 mmol/L   Green Top (Gel)    Collection Time: 11/18/24 10:04 PM   Result Value Ref Range    Extra Tube Hold for add-ons.    Lavender Top    Collection Time: 11/18/24 10:04 PM   Result Value Ref Range    Extra Tube hold for add-on    Gold Top - SST    Collection Time: 11/18/24 10:04 PM   Result Value Ref Range    Extra Tube Hold for add-ons.    Gray Top    Collection Time: 11/18/24 10:04 PM   Result Value Ref Range    Extra Tube Hold for add-ons.    Light Blue Top    Collection Time: 11/18/24 10:04 PM   Result Value Ref Range    Extra Tube Hold for add-ons.    CBC Auto Differential    Collection Time: 11/18/24 10:04 PM    Specimen: Arm, Left; Blood   Result Value Ref Range    WBC 18.37 (H) 3.40 - 10.80 10*3/mm3    RBC 5.52 (H) 3.77 - 5.28 10*6/mm3    Hemoglobin 16.8 (H) 12.0 - 15.9 g/dL    Hematocrit 48.8 (H) 34.0 - 46.6 %    MCV 88.4 79.0 - 97.0 fL    MCH 30.4 26.6 - 33.0 pg    MCHC 34.4 31.5 - 35.7 g/dL    RDW 12.1 (L) 12.3 - 15.4 %    RDW-SD 39.8 37.0 - 54.0 fl    MPV 8.8 6.0 - 12.0 fL    Platelets 394 140 - 450 10*3/mm3    Neutrophil % 90.1 (H) 42.7 - 76.0 %    Lymphocyte % 7.3 (L) 19.6 - 45.3 %    Monocyte % 2.0 (L) 5.0 - 12.0 %    Eosinophil % 0.0 (L) 0.3 - 6.2 %    Basophil % 0.2 0.0 - 1.5 %    Immature Grans % 0.4 0.0 - 0.5 %    Neutrophils, Absolute 16.53 (H) 1.70 - 7.00 10*3/mm3    Lymphocytes, Absolute 1.35 0.70 - 3.10 10*3/mm3    Monocytes, Absolute 0.37 0.10 - 0.90 10*3/mm3    Eosinophils, Absolute 0.00 0.00 - 0.40 10*3/mm3    Basophils, Absolute 0.04 0.00 - 0.20 10*3/mm3    Immature Grans, Absolute 0.08 (H) 0.00 - 0.05 10*3/mm3    nRBC 0.0 0.0 - 0.2 /100 WBC   Single High Sensitivity Troponin T    Collection Time: 11/18/24 10:04 PM    Specimen: Arm, Left; Blood   Result Value Ref Range    HS Troponin T <6 <14 ng/L    ECG 12 Lead Tachycardia    Collection Time: 11/19/24  1:09 AM   Result Value Ref Range    QT Interval 316 ms    QTC Interval 446 ms   Urinalysis With Microscopic If Indicated (No Culture) - Urine, Clean Catch    Collection Time: 11/19/24  1:10 AM    Specimen: Urine, Clean Catch   Result Value Ref Range    Color, UA Yellow Yellow, Straw    Appearance, UA Turbid (A) Clear    pH, UA 8.5 (H) 5.0 - 8.0    Specific Gravity, UA 1.012 1.001 - 1.030    Glucose, UA Negative Negative    Ketones, UA Negative Negative    Bilirubin, UA Negative Negative    Blood, UA Moderate (2+) (A) Negative    Protein, UA Negative Negative    Leuk Esterase, UA Negative Negative    Nitrite, UA Negative Negative    Urobilinogen, UA 0.2 E.U./dL 0.2 - 1.0 E.U./dL   Urinalysis, Microscopic Only - Urine, Clean Catch    Collection Time: 11/19/24  1:10 AM    Specimen: Urine, Clean Catch   Result Value Ref Range    RBC, UA 21-50 (A) None Seen, 0-2 /HPF    WBC, UA 0-2 None Seen, 0-2 /HPF    Bacteria, UA None Seen None Seen, Trace /HPF    Squamous Epithelial Cells, UA 0-2 None Seen, 0-2 /HPF    Hyaline Casts, UA 0-6 0 - 6 /LPF    Methodology Automated Microscopy    STAT Lactic Acid, Reflex    Collection Time: 11/19/24  2:04 AM    Specimen: Blood   Result Value Ref Range    Lactate 2.9 (C) 0.5 - 2.0 mmol/L   STAT Lactic Acid, Reflex    Collection Time: 11/19/24  3:38 AM    Specimen: Blood   Result Value Ref Range    Lactate 1.8 0.5 - 2.0 mmol/L   D-dimer, Quantitative    Collection Time: 11/19/24  3:38 AM    Specimen: Blood   Result Value Ref Range    D-Dimer, Quantitative 0.32 0.00 - 0.58 MCGFEU/mL     Note: In addition to lab results from this visit, the labs listed above may include labs taken at another facility or during a different encounter within the last 24 hours. Please correlate lab times with ED admission and discharge times for further clarification of the services performed during this visit.                 Kemar Germain MD  11/19/24  [id number]     Ear Star Wedge Flap Text: The defect edges were debeveled with a #15 blade scalpel.  Given the location of the defect and the proximity to free margins (helical rim) an ear star wedge flap was deemed most appropriate.  Using a sterile surgical marker, the appropriate flap was drawn incorporating the defect and placing the expected incisions between the helical rim and antihelix where possible.  The area thus outlined was incised through and through with a #15 scalpel blade.

## 2024-11-19 NOTE — PROGRESS NOTES
[facility] Medicine Services  ADMISSION FOLLOW-UP NOTE          Patient admitted after midnight, H&P by my partner performed earlier on today's date reviewed.  Interim findings, labs, and charting also reviewed.        [facility] Problem List has been managed and updated to include any new diagnoses:  Active Hospital Problems    Diagnosis  POA    **Gastritis [K29.70]  Yes    Intractable nausea and vomiting [R11.2]  Unknown      Resolved Hospital Problems   No resolved problems to display.         ADDITIONAL PLAN:  - detailed assessment and plan from admission reviewed  -59-year-old female with a medical history only significant for recent cataract repair who presents to [facility] ED for complaint of intractable nausea/vomiting and headache. She reports that she underwent cataract repair earlier on day of admission.        Intractable Nausea/vomiting  Gastritis  -CT head unremarkable  -CT abd/pelvis tonight shows findings suggestive of gastritis  -UA unremarkable  -Leukocytosis 18.37 on admission. Lactate 2.9 (normalized after IV fluids)  -Resp panel PCR negative  -Toradol prn  -add ppi    Constipation  -Add lactulose and MiraLAX    Expected Discharge   Expected Discharge Date: 11/21/2024; Expected Discharge Time:      Jessica Mendez DO  11/19/24

## 2024-11-19 NOTE — ED NOTES
" Faye Villarreal    Nursing Report ED to Floor:  Mental status: A&OX4  Ambulatory status: ADLIB  Oxygen Therapy:  RA  Cardiac Rhythm: SINUS TACH  Admitted from: HOME  Safety Concerns:  NA  Social Issues: NA  ED Room #:  15    ED Nurse Phone Extension - [phone] or may call [phone].      HPI:   Chief Complaint   Patient presents with    Vomiting       Past Medical History:  No past medical history on file.     Past Surgical History:  No past surgical history on file.     Admitting Doctor:   No admitting provider for patient encounter.    Consulting Provider(s):  Consults       No orders found for last 30 day(s).             Admitting Diagnosis:   The primary encounter diagnosis was Sinus tachycardia. Diagnoses of Lactic acidemia, Nausea and vomiting in adult, and Acute intractable tension-type headache were also pertinent to this visit.    Most Recent Vitals:   Vitals:    11/18/24 2143 11/19/24 0042   BP: 135/79 139/75   BP Location: Left arm    Patient Position: Sitting    Pulse: 120 120   Resp: 18 16   Temp: 98.3 °F (36.8 °C)    TempSrc: Oral    SpO2: 94% 94%   Weight: 79.4 kg (175 lb)    Height: 160 cm (63\")        Active LDAs/IV Access:   Lines, Drains & Airways       Active LDAs       Name Placement date Placement time Site Days    Peripheral IV 11/18/24 2205 Left Antecubital 11/18/24 2205  Antecubital  less than 1                    Labs (abnormal labs have a star):   Labs Reviewed   COMPREHENSIVE METABOLIC PANEL - Abnormal; Notable for the following components:       Result Value    Glucose 208 (*)     CO2 18.0 (*)     Total Protein 9.3 (*)     ALT (SGPT) 40 (*)     AST (SGOT) 48 (*)     Alkaline Phosphatase 175 (*)     Anion Gap 17.0 (*)     All other components within normal limits    Narrative:     GFR Normal >60  Chronic Kidney Disease <60  Kidney Failure <15     LIPASE - Abnormal; Notable for the following components:    Lipase 10 (*)     All other components within normal limits   LACTIC ACID, PLASMA - Abnormal; " Notable for the following components:    Lactate 2.8 (*)     All other components within normal limits   CBC WITH AUTO DIFFERENTIAL - Abnormal; Notable for the following components:    WBC 18.37 (*)     RBC 5.52 (*)     Hemoglobin 16.8 (*)     Hematocrit 48.8 (*)     RDW 12.1 (*)     Neutrophil % 90.1 (*)     Lymphocyte % 7.3 (*)     Monocyte % 2.0 (*)     Eosinophil % 0.0 (*)     Neutrophils, Absolute 16.53 (*)     Immature Grans, Absolute 0.08 (*)     All other components within normal limits   LACTIC ACID, REFLEX - Abnormal; Notable for the following components:    Lactate 2.9 (*)     All other components within normal limits   URINALYSIS W/ MICROSCOPIC IF INDICATED (NO CULTURE) - Abnormal; Notable for the following components:    Appearance, UA Turbid (*)     pH, UA 8.5 (*)     Blood, UA Moderate (2+) (*)     All other components within normal limits   URINALYSIS, MICROSCOPIC ONLY - Abnormal; Notable for the following components:    RBC, UA 21-50 (*)     All other components within normal limits   SINGLE HS TROPONIN T - Normal    Narrative:     High Sensitive Troponin T Reference Range:  <14.0 ng/L- Negative Female for AMI  <22.0 ng/L- Negative Male for AMI  >=14 - Abnormal Female indicating possible myocardial injury.  >=22 - Abnormal Male indicating possible myocardial injury.   Clinicians would have to utilize clinical acumen, EKG, Troponin, and serial changes to determine if it is an Acute Myocardial Infarction or myocardial injury due to an underlying chronic condition.        RAINBOW DRAW    Narrative:     The following orders were created for panel order [place] Draw.  Procedure                               Abnormality         Status                     ---------                               -----------         ------                     Green Top (Gel)[[id number]]                                  Final result               Lavender Top[[id number]]                                     Final result                Gold Top - SST[[id number]]                                   Final result               Gray Top[[id number]]                                         Final result               Light Blue Top[[id number]]                                   Final result                 Please view results for these tests on the individual orders.   LACTIC ACID, REFLEX   CBC AND DIFFERENTIAL    Narrative:     The following orders were created for panel order CBC & Differential.  Procedure                               Abnormality         Status                     ---------                               -----------         ------                     CBC Auto Differential[[id number]]        Abnormal            Final result                 Please view results for these tests on the individual orders.   GREEN TOP   LAVENDER TOP   GOLD TOP - SST   GRAY TOP   LIGHT BLUE TOP       Meds Given in ED:   Medications   Sodium Chloride (PF) 0.9 % 10 mL (has no administration in time range)   sodium chloride 0.9 % flush 10 mL (has no administration in time range)   lactated ringers bolus 1,000 mL (0 mL Intravenous Stopped 11/19/24 0116)   metoclopramide (REGLAN) injection 10 mg (10 mg Intravenous Given 11/18/24 2210)   ketorolac (TORADOL) injection 15 mg (15 mg Intravenous Given 11/18/24 2210)   diphenhydrAMINE (BENADRYL) injection 25 mg (25 mg Intravenous Given 11/18/24 2210)   ondansetron (ZOFRAN) injection 4 mg (4 mg Intravenous Given 11/18/24 2353)   lactated ringers bolus 1,000 mL (0 mL Intravenous Stopped 11/19/24 0249)   iopamidol (ISOVUE-300) 61 % injection 100 mL (75 mL Intravenous Given 11/19/24 0133)           Last NIH score:                                                          Dysphagia screening results:  Patient Factors Component (Dysphagia:Stroke or Rule-out)  Best Eye Response: 4-->(E4) spontaneous (11/18/24 2159)  Best Motor Response: 6-->(M6) obeys commands (11/18/24 2159)  Best Verbal Response: 5-->(V5) oriented (11/18/24  2159)  Dane Coma Scale Score: 15 (11/18/24 2159)     Dane Coma Scale:  No data recorded     CIWA:        Restraint Type:            Isolation Status:  No active isolations

## 2024-11-20 ENCOUNTER — READMISSION MANAGEMENT (OUTPATIENT)
Dept: CALL CENTER | Facility: HOSPITAL | Age: 59
End: 2024-11-20
Payer: COMMERCIAL

## 2024-11-20 ENCOUNTER — TELEPHONE (OUTPATIENT)
Dept: INTERNAL MEDICINE | Facility: CLINIC | Age: 59
End: 2024-11-20

## 2024-11-20 VITALS
TEMPERATURE: 96.4 F | BODY MASS INDEX: 31.01 KG/M2 | HEART RATE: 89 BPM | WEIGHT: 175 LBS | RESPIRATION RATE: 18 BRPM | HEIGHT: 63 IN | OXYGEN SATURATION: 95 % | DIASTOLIC BLOOD PRESSURE: 83 MMHG | SYSTOLIC BLOOD PRESSURE: 140 MMHG

## 2024-11-20 LAB
ALBUMIN SERPL-MCNC: 3.8 G/DL (ref 3.5–5.2)
ANION GAP SERPL CALCULATED.3IONS-SCNC: 11 MMOL/L (ref 5–15)
BASOPHILS # BLD AUTO: 0.06 10*3/MM3 (ref 0–0.2)
BASOPHILS NFR BLD AUTO: 0.4 % (ref 0–1.5)
BUN SERPL-MCNC: 8 MG/DL (ref 6–20)
BUN/CREAT SERPL: 10.3 (ref 7–25)
CALCIUM SPEC-SCNC: 8.9 MG/DL (ref 8.6–10.5)
CHLORIDE SERPL-SCNC: 107 MMOL/L (ref 98–107)
CO2 SERPL-SCNC: 23 MMOL/L (ref 22–29)
CREAT SERPL-MCNC: 0.78 MG/DL (ref 0.57–1)
DEPRECATED RDW RBC AUTO: 40.9 FL (ref 37–54)
EGFRCR SERPLBLD CKD-EPI 2021: 88.2 ML/MIN/1.73
EOSINOPHIL # BLD AUTO: 0.03 10*3/MM3 (ref 0–0.4)
EOSINOPHIL NFR BLD AUTO: 0.2 % (ref 0.3–6.2)
ERYTHROCYTE [DISTWIDTH] IN BLOOD BY AUTOMATED COUNT: 12.4 % (ref 12.3–15.4)
GLUCOSE SERPL-MCNC: 126 MG/DL (ref 65–99)
HCT VFR BLD AUTO: 42.5 % (ref 34–46.6)
HGB BLD-MCNC: 14.6 G/DL (ref 12–15.9)
IMM GRANULOCYTES # BLD AUTO: 0.05 10*3/MM3 (ref 0–0.05)
IMM GRANULOCYTES NFR BLD AUTO: 0.4 % (ref 0–0.5)
LYMPHOCYTES # BLD AUTO: 3.6 10*3/MM3 (ref 0.7–3.1)
LYMPHOCYTES NFR BLD AUTO: 25.8 % (ref 19.6–45.3)
MCH RBC QN AUTO: 30.9 PG (ref 26.6–33)
MCHC RBC AUTO-ENTMCNC: 34.4 G/DL (ref 31.5–35.7)
MCV RBC AUTO: 89.9 FL (ref 79–97)
MONOCYTES # BLD AUTO: 0.93 10*3/MM3 (ref 0.1–0.9)
MONOCYTES NFR BLD AUTO: 6.7 % (ref 5–12)
NEUTROPHILS NFR BLD AUTO: 66.5 % (ref 42.7–76)
NEUTROPHILS NFR BLD AUTO: 9.31 10*3/MM3 (ref 1.7–7)
NRBC BLD AUTO-RTO: 0 /100 WBC (ref 0–0.2)
PHOSPHATE SERPL-MCNC: 2.6 MG/DL (ref 2.5–4.5)
PLATELET # BLD AUTO: 343 10*3/MM3 (ref 140–450)
PMV BLD AUTO: 8.9 FL (ref 6–12)
POTASSIUM SERPL-SCNC: 3.8 MMOL/L (ref 3.5–5.2)
RBC # BLD AUTO: 4.73 10*6/MM3 (ref 3.77–5.28)
SODIUM SERPL-SCNC: 141 MMOL/L (ref 136–145)
WBC NRBC COR # BLD AUTO: 13.98 10*3/MM3 (ref 3.4–10.8)

## 2024-11-20 PROCEDURE — 85025 COMPLETE CBC W/AUTO DIFF WBC: CPT | Performed by: PHYSICIAN ASSISTANT

## 2024-11-20 PROCEDURE — G0378 HOSPITAL OBSERVATION PER HR: HCPCS

## 2024-11-20 PROCEDURE — 99239 HOSP IP/OBS DSCHRG MGMT >30: CPT

## 2024-11-20 PROCEDURE — 80069 RENAL FUNCTION PANEL: CPT | Performed by: INTERNAL MEDICINE

## 2024-11-20 PROCEDURE — 96376 TX/PRO/DX INJ SAME DRUG ADON: CPT

## 2024-11-20 RX ORDER — ONDANSETRON 4 MG/1
4 TABLET, ORALLY DISINTEGRATING ORAL EVERY 6 HOURS PRN
Qty: 12 TABLET | Refills: 0 | Status: SHIPPED | OUTPATIENT
Start: 2024-11-20 | End: 2024-11-23

## 2024-11-20 RX ADMIN — DIFLUPREDNATE OPHTHALMIC 1 DROP: 0.5 EMULSION OPHTHALMIC at 08:01

## 2024-11-20 RX ADMIN — Medication 1 DROP: at 08:00

## 2024-11-20 RX ADMIN — PANTOPRAZOLE SODIUM 40 MG: 40 INJECTION, POWDER, FOR SOLUTION INTRAVENOUS at 08:01

## 2024-11-20 RX ADMIN — Medication 10 ML: at 08:01

## 2024-11-20 NOTE — DISCHARGE SUMMARY
[facility]  DISCHARGE SUMMARY    Patient Name: Faye Villarreal  : 1965  MRN: [id number]    Date of Admission: 2024  9:46 PM  Date of Discharge:  2024  Primary Care Physician: Provider, No Known    Consults       No orders found from 10/20/2024 to 2024.            Hospital Course     Presenting Problem: Nausea/vomiting    Active Hospital Problems    Diagnosis  POA   • **Gastritis [K29.70]  Yes   • Intractable nausea and vomiting [R11.2]  Unknown      Resolved Hospital Problems   No resolved problems to display.        Hospital Course:  Faey Villarreal is a 59 y.o. female with personal medical history only significant for recent cataract repair presents to [facility] ED for complaint of intractable nausea/vomiting and headache. She reports that she underwent cataract repair earlier today. She received IV fluids and anti-nausea medicine, symptoms have resolved.     Intractable nausea/vomiting  Gastritis  Post-anesthesia related?  - CT head unremarkable  - CT abd/pelvis tonight shows findings suggestive of gastritis  - UA unremarkable  - Leukocytosis 18.37. Lactate 2.9 (normalized after IV fluids)  - Resp panel PCR pending  - Received Zofran and compazine for nausea. Sent Rx for Zofran  - Keep post-op appointment today,  @ 11:45am    Constipation, resolved    Discharge Follow Up Recommendations for outpatient labs/diagnostics:  Zofran as needed at home  Keep post-op appointment today,  @ 11:45am    Day of Discharge     HPI:   Patient reports she feels much better compared to yesterday. She reports she no longer feels nauseous and has had several formed bowel movements this AM.  present at bedside.    Review of Systems  Gen- No fevers, chills  CV- No chest pain, palpitations  Resp- No cough, dyspnea  GI- No N/V/D, abd pain     Vital Signs:   Temp:  [96.4 °F (35.8 °C)-98.3 °F (36.8 °C)] 96.4 °F (35.8 °C)  Heart Rate:  [] 89  Resp:  [18]  18  BP: (132-151)/(83-90) 140/83      Physical Exam:  Constitutional: Well-appearing female lying in bed in NAD  HENT: NCAT, mucous membranes moist  Respiratory: Clear to auscultation bilaterally, nonlabored respirations   Cardiovascular: RRR, no murmurs, rubs, or gallops, no bilateral ankle edema  Gastrointestinal: Positive bowel sounds, soft, nontender, nondistended  Musculoskeletal: BROWN spontaneously  Psychiatric: Appropriate affect, cooperative  Neurologic: Alert and oriented, speech clear  Skin: No rashes on exposed areas    Pertinent  and/or Most Recent Results     LAB RESULTS:      Lab 11/20/24  0516 11/19/24  0632 11/19/24  0338 11/19/24  0204 11/18/24 2204   WBC 13.98* 21.93*  --   --  18.37*   HEMOGLOBIN 14.6 15.4  --   --  16.8*   HEMATOCRIT 42.5 45.3  --   --  48.8*   PLATELETS 343 373  --   --  394   NEUTROS ABS 9.31* 19.02*  --   --  16.53*   IMMATURE GRANS (ABS) 0.05 0.13*  --   --  0.08*   LYMPHS ABS 3.60* 1.79  --   --  1.35   MONOS ABS 0.93* 0.94*  --   --  0.37   EOS ABS 0.03 0.00  --   --  0.00   MCV 89.9 88.8  --   --  88.4   LACTATE  --   --  1.8 2.9* 2.8*   D DIMER QUANT  --   --  0.32  --   --          Lab 11/20/24  0516 11/19/24  0631 11/18/24 2204   SODIUM 141 137 137   POTASSIUM 3.8 4.3 3.9   CHLORIDE 107 104 102   CO2 23.0 18.0* 18.0*   ANION GAP 11.0 15.0 17.0*   BUN 8 8 8   CREATININE 0.78 0.69 0.83   EGFR 88.2 100.7 81.8   GLUCOSE 126* 152* 208*   CALCIUM 8.9 9.6 9.9   PHOSPHORUS 2.6  --   --    HEMOGLOBIN A1C  --   --  6.10*   TSH  --   --  0.597         Lab 11/20/24  0516 11/18/24 2204   TOTAL PROTEIN  --  9.3*   ALBUMIN 3.8 4.8   GLOBULIN  --  4.5   ALT (SGPT)  --  40*   AST (SGOT)  --  48*   BILIRUBIN  --  0.8   ALK PHOS  --  175*   LIPASE  --  10*         Lab 11/18/24  2204   HSTROP T <6                 Brief Urine Lab Results  (Last result in the past 365 days)        Color   Clarity   Blood   Leuk Est   Nitrite   Protein   CREAT   Urine HCG        11/19/24 0110 Yellow    Turbid   Moderate (2+)   Negative   Negative   Negative                 Microbiology Results (last 10 days)       Procedure Component Value - Date/Time    Respiratory Panel PCR w/COVID-19(SARS-CoV-2) MARCELINO/MICKI/NOY/PAD/COR/LUIS EDUARDO In-House, NP Swab in UTM/VTM, 2 HR TAT - Swab, Nasopharynx [[id number]]  (Normal) Collected: 11/19/24 0438    Lab Status: Final result Specimen: Swab from Nasopharynx Updated: 11/19/24 0539     ADENOVIRUS, PCR Not Detected     Coronavirus 229E Not Detected     Coronavirus HKU1 Not Detected     Coronavirus NL63 Not Detected     Coronavirus OC43 Not Detected     COVID19 Not Detected     Human Metapneumovirus Not Detected     Human Rhinovirus/Enterovirus Not Detected     Influenza A PCR Not Detected     Influenza B PCR Not Detected     Parainfluenza Virus 1 Not Detected     Parainfluenza Virus 2 Not Detected     Parainfluenza Virus 3 Not Detected     Parainfluenza Virus 4 Not Detected     RSV, PCR Not Detected     Bordetella pertussis pcr Not Detected     Bordetella parapertussis PCR Not Detected     Chlamydophila pneumoniae PCR Not Detected     Mycoplasma pneumo by PCR Not Detected    Narrative:      In the setting of a positive respiratory panel with a viral infection PLUS a negative procalcitonin without other underlying concern for bacterial infection, consider observing off antibiotics or discontinuation of antibiotics and continue supportive care. If the respiratory panel is positive for atypical bacterial infection (Bordetella pertussis, Chlamydophila pneumoniae, or Mycoplasma pneumoniae), consider antibiotic de-escalation to target atypical bacterial infection.            CT Abdomen Pelvis With Contrast    Result Date: 11/19/2024  CT ABDOMEN PELVIS W CONTRAST Date of Exam: 11/19/2024 1:18 AM EST Indication: upper abdominal pain, nausea and vomiting. Comparison: None available. Technique: Axial CT images were obtained of the abdomen and pelvis following the uneventful intravenous  administration of intravenous contrast. Reconstructed coronal and sagittal images were also obtained. Automated exposure control and iterative construction methods were used. Findings: Lung Bases:   The visualized lung bases and lower mediastinal structures are unremarkable. Liver: Liver is normal in size and CT density. No focal lesions. Biliary/Gallbladder:  The gallbladder has been resected.. There is enlargement of the intrahepatic and extrahepatic biliary tree with no evidence of filling defect. The common bile duct measures up to 8 mm. This may be related to postcholecystectomy status. Spleen: Spleen is normal in size and CT density. Pancreas:  The pancreas appears atrophic.. There is no evidence of pancreatic mass or peripancreatic fluid. Kidneys:  Kidneys are normal in size. There are no stones or hydronephrosis. Adrenals:  Adrenal glands are unremarkable. Retroperitoneal/Lymph Nodes/Vasculature:  No retroperitoneal adenopathy is identified. Gastrointestinal/Mesentery:  Diffuse stomach wall thickening is present. Moderate to large stool burden present. No evidence of hernia. The bowel loops are non-dilated without wall thickening or mass. The appendix has been resected.. No evidence of obstruction. No free air. No mesenteric fluid collections identified. Bladder:  The bladder is normal. Genital:   Unremarkable       Bony Structures:   Visualized bony structures are consistent with the patient's age.     Impression: 1.Diffuse stomach wall thickening likely related to gastritis. 2.Prominence of the intrahepatic and extrahepatic biliary tree. No definite filling defects identified. Findings may be related to postcholecystectomy status. Recommend clinical correlation. 3.Moderate to large stool burden likely related to constipation. 4.Ancillary findings as described above. Electronically Signed: Darya Reynolds MD  11/19/2024 1:43 AM EST  Workstation ID: [id number]    CT Head Without Contrast    Result Date:  11/18/2024  CT HEAD WO CONTRAST Date of Exam: 11/18/2024 11:00 PM EST Indication: headache, vomiting. Comparison: None available. Technique: Axial CT images were obtained of the head without contrast administration.  Automated exposure control and iterative construction methods were used. Findings: There is no evidence of hemorrhage. There is no mass effect or midline shift. There is no extracerebral collection. Ventricles are normal in size and configuration for patient's stated age.  Posterior fossa is within normal limits. Calvarium and skull base appear intact.   Visualized sinuses show no air fluid levels. Visualized orbits are unremarkable.     Impression: No acute intracranial process. Electronically Signed: Darya Reynolds MD  11/18/2024 11:06 PM EST  Workstation ID: [id number]                 Plan for Follow-up of Pending Labs/Results:     Discharge Details        Discharge Medications        New Medications        Instructions Start Date   ondansetron ODT 4 MG disintegrating tablet  Commonly known as: ZOFRAN-ODT   4 mg, Oral, Every 6 Hours PRN             Continue These Medications        Instructions Start Date   difluprednate 0.05 % ophthalmic emulsion  Commonly known as: DUREZOL   1 drop, Left Eye, 4 Times Daily      Prednisolon-Moxiflox-Bromfenac 1-0.5-0.075 % solution   1 drop, Ophthalmic, 4 Times Daily, Apply to left eye only               Allergies   Allergen Reactions   • Bactrim [Sulfamethoxazole-Trimethoprim] Unknown - Low Severity         Discharge Disposition:  Home or Self Care    Diet:  Hospital:  No active diet order      Diet Instructions       Diet: Regular/House Diet; Regular (IDDSI 7); Thin (IDDSI 0)      Discharge Diet: Regular/House Diet    Texture: Regular (IDDSI 7)    Fluid Consistency: Thin (IDDSI 0)             Activity:  Activity Instructions       Activity as Tolerated              Restrictions or Other Recommendations:       CODE STATUS:    Code Status and Medical Interventions: CPR  (Attempt to Resuscitate); Full Support   Ordered at: 11/19/24 0507     Code Status (Patient has no pulse and is not breathing):    CPR (Attempt to Resuscitate)     Medical Interventions (Patient has pulse or is breathing):    Full Support       Future Appointments   Date Time Provider Department Center   11/22/2024  8:30 AM Elin Gonzalez APRN [facility] Ava Carlisle Fugate, PA-C  11/20/24      Time Spent on Discharge:  I spent  33  minutes on this discharge activity which included: face-to-face encounter with the patient, reviewing the data in the system, coordination of the care with the nursing staff as well as consultants, documentation, and entering orders.

## 2024-11-20 NOTE — CASE MANAGEMENT/SOCIAL WORK
Case Management Discharge Note      Final Note: Patient is discharging today. CM spoke to patient and spouse at bedside. Her plan is home with spouse to transport. No needs noted.         Selected Continued Care - Admitted Since 11/18/2024       Destination    No services have been selected for the patient.                Durable Medical Equipment    No services have been selected for the patient.                Dialysis/Infusion    No services have been selected for the patient.                Home Medical Care    No services have been selected for the patient.                Therapy    No services have been selected for the patient.                Community Resources    No services have been selected for the patient.                Community & DME    No services have been selected for the patient.                    Transportation Services  Private: Car    Final Discharge Disposition Code: 01 - home or self-care

## 2024-11-20 NOTE — TELEPHONE ENCOUNTER
Caller: [facility]    Relationship to patient: Other    Best call back number: [phone]    New or established patient?  [x] New  [] Established    Date of discharge: 11/20/24    Facility discharged from: [facility]    Diagnosis/Symptoms: GASTRITIS     Length of stay (If applicable): N/A    Specialty Only: Did you see a [facility] provider?    [] Yes  [] No  If so, who?

## 2024-11-20 NOTE — OUTREACH NOTE
Prep Survey      Flowsheet Row Responses   [facility] [place] patient discharged from? [place]   Is LACE score < 7 ? Yes   Eligibility [facility]   Date of Admission 11/18/24   Date of Discharge 11/20/24   Discharge Disposition Home or Self Care   Discharge diagnosis Gastritis   Does the patient have one of the following disease processes/diagnoses(primary or secondary)? Other   Does the patient have Home health ordered? No   Is there a DME ordered? No   Comments regarding appointments new PCP appt   Medication alerts for this patient see avs   Prep survey completed? Yes            Smita RODRIGUEZ - Registered Nurse

## 2024-11-20 NOTE — PLAN OF CARE
Problem: Adult Inpatient Plan of Care  Goal: Plan of Care Review  Outcome: Met  Goal: Patient-Specific Goal (Individualized)  Outcome: Met  Goal: Absence of Hospital-Acquired Illness or Injury  Outcome: Met  Intervention: Identify and Manage Fall Risk  Recent Flowsheet Documentation  Taken 11/20/2024 0804 by Kimi Pollock, RN  Safety Promotion/Fall Prevention: room organization consistent  Intervention: Prevent Skin Injury  Recent Flowsheet Documentation  Taken 11/20/2024 0804 by Kimi Pollock RN  Body Position: position changed independently  Skin Protection: drying agents applied  Goal: Optimal Comfort and Wellbeing  Outcome: Met  Goal: Readiness for Transition of Care  Outcome: Met     Problem: Infection  Goal: Absence of Infection Signs and Symptoms  Outcome: Met   Goal Outcome Evaluation:

## 2024-11-21 ENCOUNTER — TRANSITIONAL CARE MANAGEMENT TELEPHONE ENCOUNTER (OUTPATIENT)
Dept: CALL CENTER | Facility: HOSPITAL | Age: 59
End: 2024-11-21
Payer: COMMERCIAL

## 2024-11-21 NOTE — OUTREACH NOTE
Call Center TCM Note      Flowsheet Row Responses   [facility] patient discharged from? [place]   Does the patient have one of the following disease processes/diagnoses(primary or secondary)? Other   TCM attempt successful? No   Unsuccessful attempts Attempt 1            Edna Goldberg RN    11/21/2024, 15:03 EST

## 2024-11-22 ENCOUNTER — TRANSITIONAL CARE MANAGEMENT TELEPHONE ENCOUNTER (OUTPATIENT)
Dept: CALL CENTER | Facility: HOSPITAL | Age: 59
End: 2024-11-22
Payer: COMMERCIAL

## 2024-11-22 ENCOUNTER — OFFICE VISIT (OUTPATIENT)
Dept: INTERNAL MEDICINE | Facility: CLINIC | Age: 59
End: 2024-11-22
Payer: COMMERCIAL

## 2024-11-22 VITALS
HEIGHT: 63 IN | SYSTOLIC BLOOD PRESSURE: 108 MMHG | HEART RATE: 84 BPM | WEIGHT: 172 LBS | BODY MASS INDEX: 30.48 KG/M2 | DIASTOLIC BLOOD PRESSURE: 70 MMHG | TEMPERATURE: 98.1 F | RESPIRATION RATE: 20 BRPM

## 2024-11-22 DIAGNOSIS — K59.00 CONSTIPATION, UNSPECIFIED CONSTIPATION TYPE: ICD-10-CM

## 2024-11-22 DIAGNOSIS — K29.00 ACUTE GASTRITIS WITHOUT HEMORRHAGE, UNSPECIFIED GASTRITIS TYPE: Primary | ICD-10-CM

## 2024-11-22 LAB
ALBUMIN SERPL-MCNC: 4 G/DL (ref 3.5–5.2)
ALBUMIN/GLOB SERPL: 1.2 G/DL
ALP SERPL-CCNC: 131 U/L (ref 39–117)
ALT SERPL W P-5'-P-CCNC: 29 U/L (ref 1–33)
ANION GAP SERPL CALCULATED.3IONS-SCNC: 12.2 MMOL/L (ref 5–15)
AST SERPL-CCNC: 15 U/L (ref 1–32)
BASOPHILS # BLD AUTO: 0.08 10*3/MM3 (ref 0–0.2)
BASOPHILS NFR BLD AUTO: 0.6 % (ref 0–1.5)
BILIRUB SERPL-MCNC: 0.6 MG/DL (ref 0–1.2)
BUN SERPL-MCNC: 9 MG/DL (ref 6–20)
BUN/CREAT SERPL: 9.8 (ref 7–25)
CALCIUM SPEC-SCNC: 9.6 MG/DL (ref 8.6–10.5)
CHLORIDE SERPL-SCNC: 99 MMOL/L (ref 98–107)
CO2 SERPL-SCNC: 26.8 MMOL/L (ref 22–29)
CREAT SERPL-MCNC: 0.92 MG/DL (ref 0.57–1)
DEPRECATED RDW RBC AUTO: 40.9 FL (ref 37–54)
EGFRCR SERPLBLD CKD-EPI 2021: 72.3 ML/MIN/1.73
EOSINOPHIL # BLD AUTO: 0.15 10*3/MM3 (ref 0–0.4)
EOSINOPHIL NFR BLD AUTO: 1.1 % (ref 0.3–6.2)
ERYTHROCYTE [DISTWIDTH] IN BLOOD BY AUTOMATED COUNT: 12.7 % (ref 12.3–15.4)
GLOBULIN UR ELPH-MCNC: 3.4 GM/DL
GLUCOSE SERPL-MCNC: 99 MG/DL (ref 65–99)
HCT VFR BLD AUTO: 43.9 % (ref 34–46.6)
HGB BLD-MCNC: 15.3 G/DL (ref 12–15.9)
IMM GRANULOCYTES # BLD AUTO: 0.03 10*3/MM3 (ref 0–0.05)
IMM GRANULOCYTES NFR BLD AUTO: 0.2 % (ref 0–0.5)
LYMPHOCYTES # BLD AUTO: 3.78 10*3/MM3 (ref 0.7–3.1)
LYMPHOCYTES NFR BLD AUTO: 28.5 % (ref 19.6–45.3)
MCH RBC QN AUTO: 30.8 PG (ref 26.6–33)
MCHC RBC AUTO-ENTMCNC: 34.9 G/DL (ref 31.5–35.7)
MCV RBC AUTO: 88.3 FL (ref 79–97)
MONOCYTES # BLD AUTO: 0.79 10*3/MM3 (ref 0.1–0.9)
MONOCYTES NFR BLD AUTO: 6 % (ref 5–12)
NEUTROPHILS NFR BLD AUTO: 63.6 % (ref 42.7–76)
NEUTROPHILS NFR BLD AUTO: 8.41 10*3/MM3 (ref 1.7–7)
NRBC BLD AUTO-RTO: 0 /100 WBC (ref 0–0.2)
PLATELET # BLD AUTO: 389 10*3/MM3 (ref 140–450)
PMV BLD AUTO: 9.1 FL (ref 6–12)
POTASSIUM SERPL-SCNC: 3.6 MMOL/L (ref 3.5–5.2)
PROT SERPL-MCNC: 7.4 G/DL (ref 6–8.5)
RBC # BLD AUTO: 4.97 10*6/MM3 (ref 3.77–5.28)
SODIUM SERPL-SCNC: 138 MMOL/L (ref 136–145)
WBC NRBC COR # BLD AUTO: 13.24 10*3/MM3 (ref 3.4–10.8)

## 2024-11-22 PROCEDURE — 85025 COMPLETE CBC W/AUTO DIFF WBC: CPT | Performed by: NURSE PRACTITIONER

## 2024-11-22 PROCEDURE — 80053 COMPREHEN METABOLIC PANEL: CPT | Performed by: NURSE PRACTITIONER

## 2024-11-22 NOTE — PATIENT INSTRUCTIONS
MyPlate from USDA  MyPlate is an outline of a general healthy diet based on the Dietary Guidelines for Americans, 9987-0831, from the U.S. Department of Agriculture (USDA). It sets guidelines for how much food you should eat from each food group based on your age, sex, and level of physical activity.  What are tips for following MyPlate?  To follow MyPlate recommendations:  Eat a wide variety of fruits and vegetables, grains, and protein foods.  Serve smaller portions and eat less food throughout the day.  Limit portion sizes to avoid overeating.  Enjoy your food.  Get at least 150 minutes of exercise every week. This is about 30 minutes each day, 5 or more days per week.  It can be difficult to have every meal look like MyPlate. Think about MyPlate as eating guidelines for an entire day, rather than each individual meal.  Fruits and vegetables  Make one half of your plate fruits and vegetables.  Eat many different colors of fruits and vegetables each day.  For a 2,000-calorie daily food plan, eat:  2½ cups of vegetables every day.  2 cups of fruit every day.  1 cup is equal to:  1 cup raw or cooked vegetables.  1 cup raw fruit.  1 medium-sized orange, apple, or banana.  1 cup 100% fruit or vegetable juice.  2 cups raw leafy greens, such as lettuce, spinach, or kale.  ½ cup dried fruit.  Grains  One fourth of your plate should be grains.  Make at least half of the grains you eat each day whole grains.  For a 2,000-calorie daily food plan, eat 6 oz of grains every day.  1 oz is equal to:  1 slice bread.  1 cup cereal.  ½ cup cooked rice, cereal, or pasta.  Protein  One fourth of your plate should be protein.  Eat a wide variety of protein foods, including meat, poultry, fish, eggs, beans, nuts, and tofu.  For a 2,000-calorie daily food plan, eat 5½ oz of protein every day.  1 oz is equal to:  1 oz meat, poultry, or fish.  ¼ cup cooked beans.  1 egg.  ½ oz nuts or seeds.  1 Tbsp peanut butter.  Dairy  Drink fat-free  or low-fat (1%) milk.  Eat or drink dairy as a side to meals.  For a 2,000-calorie daily food plan, eat or drink 3 cups of dairy every day.  1 cup is equal to:  1 cup milk, yogurt, cottage cheese, or soy milk (soy beverage).  2 oz processed cheese.  1½ oz natural cheese.  Fats, oils, salt, and sugars  Only small amounts of oils are recommended.  Avoid foods that are high in calories and low in nutritional value (empty calories), like foods high in fat or added sugars.  Choose foods that are low in salt (sodium). Choose foods that have less than 140 milligrams (mg) of sodium per serving.  Drink water instead of sugary drinks. Drink enough fluid to keep your urine pale yellow.  Where to find support  Work with your health care provider or a dietitian to develop a customized eating plan that is right for you.  Download an rosi (mobile application) to help you track your daily food intake.  Where to find more information  USDA: ChooseMyPlate.gov  Summary  MyPlate is a general guideline for healthy eating from the USDA. It is based on the Dietary Guidelines for Americans, 2449-5265.  In general, fruits and vegetables should take up one half of your plate, grains should take up one fourth of your plate, and protein should take up one fourth of your plate.  This information is not intended to replace advice given to you by your health care provider. Make sure you discuss any questions you have with your health care provider.  Document Revised: 11/08/2021 Document Reviewed: 11/08/2021  [employer] Patient Education © 2022 [employer]

## 2024-11-22 NOTE — PROGRESS NOTES
Transitional Care Follow Up Visit  Subjective     Faye Villarreal is a 59 y.o. female who presents for a transitional care management visit.    Within 48 business hours after discharge our office contacted her via telephone to coordinate her care and needs.      I reviewed and discussed the details of that call along with the discharge summary, hospital problems, inpatient lab results, inpatient diagnostic studies, and consultation reports with Faey.      Current outpatient and discharge medications have been reconciled for the patient.  Reviewed by: SARAH Rodney          11/20/2024    10:55 AM   Date of TCM Phone Call   [facility]   Date of Admission 11/18/2024   Date of Discharge 11/20/2024   Discharge Disposition Home or Self Care     Risk for Readmission (LACE) Score: 2 (11/20/2024  6:00 AM)  *Hospitalization per discharge summary:   Hospital Course:  Faye Villarreal is a 59 y.o. female with personal medical history only significant for recent cataract repair presents to [facility] ED for complaint of intractable nausea/vomiting and headache. She reports that she underwent cataract repair earlier today. She received IV fluids and anti-nausea medicine, symptoms have resolved.     Intractable nausea/vomiting  Gastritis  Post-anesthesia related?  - CT head unremarkable  - CT abd/pelvis tonight shows findings suggestive of gastritis  - UA unremarkable  - Leukocytosis 18.37. Lactate 2.9 (normalized after IV fluids)  - Resp panel PCR pending  - Received Zofran and compazine for nausea. Sent Rx for Zofran  - Keep post-op appointment today, 11/20 @ 11:45am     Constipation, resolved     Discharge Follow Up Recommendations for outpatient labs/diagnostics:  Zofran as needed at home  Keep post-op appointment today, 11/20 @ 11:45am     *End of copy note  History of Present Illness  The patient presents for evaluation of nausea and stomach pain. She is accompanied by her .    She was recently  discharged from the hospital on Wednesday, 11/20/2024, following an episode of gastritis. She underwent eye surgery on Monday, which was followed by a visit to the ER due to severe illness. The anesthesia used during the surgery included Versed, fentanyl, and ketamine, and it is suspected that she may have had a reaction to one of these drugs. She was prescribed Zofran, which she has been taking at least once daily.    She reports slight nausea but no vomiting. Her bowel movements are regular, with the most recent one occurring this morning. She does not experience any abdominal tenderness. She reports feeling slightly better each day, but her appetite remains low and she is sleeping excessively, up to 18 hours a day. She is making an effort to stay hydrated.    She reports no fevers, chills, chest pains, palpitations, cough, or runny nose. There is no burning sensation during urination. She has no history of previous stomach issues, except for occasional constipation when consuming large amounts of cheese. She has been prescribed MiraLAX. She reports no rectal bleeding.    An endoscopy was performed in her late 20s. She underwent a colonoscopy at age 51, which was normal, and was advised to repeat the procedure in 10 years. She has had anesthesia in the past without any issues. She has had previous eye surgeries, one of which led to a rapid growth of a cataract. She is currently using steroid eye drops and is doing well with them.       The following portions of the patient's history were reviewed and updated as appropriate: allergies, current medications, past family history, past medical history, past social history, past surgical history, and problem list.    Review of Systems  A review of systems was performed, and the pertinent positives are noted in the HPI.    Objective   Physical Exam  Physical Exam  Patient appears well, no acute distress.  Pupils react to light. Right pupil is slightly larger than the  left.  No cervical adenopathy in the neck.  Lungs are clear and equal bilaterally.  Heart rate is regular.  No abdominal tenderness.  Mood is normal.    Results  Laboratory Studies  White blood cell count was elevated. Creatinine was 0.78.    Imaging  Abdominal imaging showed inflammation in the stomach.      Assessment & Plan   Diagnoses and all orders for this visit:    1. Acute gastritis without hemorrhage, unspecified gastritis type (Primary)  -     H. Pylori Antigen, Stool - Stool, Per Rectum; Future  -     CBC w AUTO Differential; Future  -     Comprehensive metabolic panel; Future    2. Constipation, unspecified constipation type      Assessment & Plan  1. Nausea and stomach pain.  Her kidney function has shown improvement with a creatinine level of 0.78. However, her white blood cell count remains elevated. The respiratory panel was negative, indicating no infection. The urinalysis was also negative. The absence of fever suggests that the elevated white blood cell count could be due to inflammation. The diffuse thickening observed in her stomach is indicative of gastritis, which could be acute or an underlying issue causing her adverse reaction. A large stool burden was noted, which could be contributing to her stomach discomfort. Her blood pressure is within normal range and she does not exhibit signs of anemia. A complete blood count (CBC) will be rechecked to monitor the white blood cell count. A stool sample will be collected to test for H. pylori, a common cause of gastritis. She is advised to take MiraLAX daily for a few weeks to soften her stools and promote regular bowel movements. If the stools become too loose or watery, she can reduce the dosage to every other day or as needed. She is also advised to maintain a bland diet for the next few days, gradually increasing it, and to ensure adequate hydration to prevent dehydration. Her kidney function will be rechecked. If symptoms persist, an endoscopy  may be considered to further investigate her stomach condition. Alternatively, a proton pump inhibitor could be prescribed to alleviate the stomach inflammation. If the H. pylori test is positive, a course of antibiotics and a proton pump inhibitor would be necessary.    Any worsening of eye, she will follow-up with ophthalmology.    Follow-up  Return in 7 weeks for her annual check-up. If her nausea and stomach pain do not improve, she will contact and a proton pump inhibitor will be considered.  Transitional Care Management Certification  I certify that the following are true:  Communication was made within 2 business days of discharge.  Complexity of Medical Decision Making is moderate.  Face to face visit occurred within 2 days.    *Note: 65619 is for high complexity patients with a face to face visit within 7 days of discharge.  99611 is for high complexity patients with a face to face on days 8-14 post discharge or medium complexity with face to face visit within 14 days post discharge.           Patient or patient representative verbalized consent for the use of Ambient Listening during the visit with  SARAH Rodney for chart documentation. 11/22/2024  17:13 EST

## 2024-11-22 NOTE — OUTREACH NOTE
Call Center TCM Note      Flowsheet Row Responses   [facility] patient discharged from? [facility]   Does the patient have one of the following disease processes/diagnoses(primary or secondary)? Other   TCM attempt successful? Yes   Discharge diagnosis Gastritis   Comments TCM appt completed on 11/23/24,  PCP appointment within 2 business days of DC fulfills the TCM requirement, no call necessary.   Does the patient have an appointment with their PCP within 7-14 days of discharge? Yes   TCM call completed? Yes            Mini Toledo RN    11/22/2024, 14:58 EST

## 2024-11-23 LAB
QT INTERVAL: 316 MS
QTC INTERVAL: 446 MS

## 2024-11-25 ENCOUNTER — LAB (OUTPATIENT)
Dept: INTERNAL MEDICINE | Facility: CLINIC | Age: 59
End: 2024-11-25
Payer: COMMERCIAL

## 2024-11-25 DIAGNOSIS — K29.00 ACUTE GASTRITIS WITHOUT HEMORRHAGE, UNSPECIFIED GASTRITIS TYPE: ICD-10-CM

## 2024-11-25 PROCEDURE — 87338 HPYLORI STOOL AG IA: CPT | Performed by: NURSE PRACTITIONER

## 2024-11-27 LAB — H PYLORI AG STL QL IA: NEGATIVE

## 2025-01-10 ENCOUNTER — OFFICE VISIT (OUTPATIENT)
Dept: INTERNAL MEDICINE | Facility: CLINIC | Age: 60
End: 2025-01-10
Payer: COMMERCIAL

## 2025-01-10 VITALS
BODY MASS INDEX: 30.79 KG/M2 | HEART RATE: 82 BPM | SYSTOLIC BLOOD PRESSURE: 118 MMHG | RESPIRATION RATE: 14 BRPM | HEIGHT: 63 IN | WEIGHT: 173.8 LBS | DIASTOLIC BLOOD PRESSURE: 82 MMHG | TEMPERATURE: 97.3 F

## 2025-01-10 DIAGNOSIS — Z00.00 ENCOUNTER FOR WELLNESS EXAMINATION: Primary | ICD-10-CM

## 2025-01-10 DIAGNOSIS — Z12.31 ENCOUNTER FOR SCREENING MAMMOGRAM FOR MALIGNANT NEOPLASM OF BREAST: ICD-10-CM

## 2025-01-10 DIAGNOSIS — Z01.419 ENCOUNTER FOR CERVICAL PAP SMEAR WITH PELVIC EXAM: ICD-10-CM

## 2025-01-10 DIAGNOSIS — Z13.6 ENCOUNTER FOR LIPID SCREENING FOR CARDIOVASCULAR DISEASE: ICD-10-CM

## 2025-01-10 DIAGNOSIS — R73.03 PREDIABETES: ICD-10-CM

## 2025-01-10 DIAGNOSIS — Z13.220 ENCOUNTER FOR LIPID SCREENING FOR CARDIOVASCULAR DISEASE: ICD-10-CM

## 2025-01-10 PROBLEM — E66.811 OBESITY (BMI 30.0-34.9): Status: ACTIVE | Noted: 2025-01-10

## 2025-01-10 LAB
ALBUMIN SERPL-MCNC: 4.1 G/DL (ref 3.5–5.2)
ALBUMIN/GLOB SERPL: 1.1 G/DL
ALP SERPL-CCNC: 123 U/L (ref 39–117)
ALT SERPL W P-5'-P-CCNC: 22 U/L (ref 1–33)
ANION GAP SERPL CALCULATED.3IONS-SCNC: 10.5 MMOL/L (ref 5–15)
AST SERPL-CCNC: 24 U/L (ref 1–32)
BASOPHILS # BLD AUTO: 0.05 10*3/MM3 (ref 0–0.2)
BASOPHILS NFR BLD AUTO: 0.5 % (ref 0–1.5)
BILIRUB SERPL-MCNC: 0.6 MG/DL (ref 0–1.2)
BUN SERPL-MCNC: 10 MG/DL (ref 6–20)
BUN/CREAT SERPL: 10.4 (ref 7–25)
CALCIUM SPEC-SCNC: 9.8 MG/DL (ref 8.6–10.5)
CHLORIDE SERPL-SCNC: 100 MMOL/L (ref 98–107)
CHOLEST SERPL-MCNC: 268 MG/DL (ref 0–200)
CO2 SERPL-SCNC: 26.5 MMOL/L (ref 22–29)
CREAT SERPL-MCNC: 0.96 MG/DL (ref 0.57–1)
DEPRECATED RDW RBC AUTO: 39.3 FL (ref 37–54)
EGFRCR SERPLBLD CKD-EPI 2021: 68.7 ML/MIN/1.73
EOSINOPHIL # BLD AUTO: 0.1 10*3/MM3 (ref 0–0.4)
EOSINOPHIL NFR BLD AUTO: 1.1 % (ref 0.3–6.2)
ERYTHROCYTE [DISTWIDTH] IN BLOOD BY AUTOMATED COUNT: 12.2 % (ref 12.3–15.4)
GLOBULIN UR ELPH-MCNC: 3.9 GM/DL
GLUCOSE SERPL-MCNC: 111 MG/DL (ref 65–99)
HBA1C MFR BLD: 6.2 % (ref 4.8–5.6)
HCT VFR BLD AUTO: 46.7 % (ref 34–46.6)
HDLC SERPL-MCNC: 58 MG/DL (ref 40–60)
HGB BLD-MCNC: 16.2 G/DL (ref 12–15.9)
IMM GRANULOCYTES # BLD AUTO: 0.03 10*3/MM3 (ref 0–0.05)
IMM GRANULOCYTES NFR BLD AUTO: 0.3 % (ref 0–0.5)
LDLC SERPL CALC-MCNC: 183 MG/DL (ref 0–100)
LDLC/HDLC SERPL: 3.12 {RATIO}
LYMPHOCYTES # BLD AUTO: 3.06 10*3/MM3 (ref 0.7–3.1)
LYMPHOCYTES NFR BLD AUTO: 33.4 % (ref 19.6–45.3)
MCH RBC QN AUTO: 30.9 PG (ref 26.6–33)
MCHC RBC AUTO-ENTMCNC: 34.7 G/DL (ref 31.5–35.7)
MCV RBC AUTO: 89.1 FL (ref 79–97)
MONOCYTES # BLD AUTO: 0.48 10*3/MM3 (ref 0.1–0.9)
MONOCYTES NFR BLD AUTO: 5.2 % (ref 5–12)
NEUTROPHILS NFR BLD AUTO: 5.43 10*3/MM3 (ref 1.7–7)
NEUTROPHILS NFR BLD AUTO: 59.5 % (ref 42.7–76)
NRBC BLD AUTO-RTO: 0 /100 WBC (ref 0–0.2)
PLATELET # BLD AUTO: 387 10*3/MM3 (ref 140–450)
PMV BLD AUTO: 9.3 FL (ref 6–12)
POTASSIUM SERPL-SCNC: 3.9 MMOL/L (ref 3.5–5.2)
PROT SERPL-MCNC: 8 G/DL (ref 6–8.5)
RBC # BLD AUTO: 5.24 10*6/MM3 (ref 3.77–5.28)
SODIUM SERPL-SCNC: 137 MMOL/L (ref 136–145)
TRIGL SERPL-MCNC: 146 MG/DL (ref 0–150)
TSH SERPL DL<=0.05 MIU/L-ACNC: 2.12 UIU/ML (ref 0.27–4.2)
VLDLC SERPL-MCNC: 27 MG/DL (ref 5–40)
WBC NRBC COR # BLD AUTO: 9.15 10*3/MM3 (ref 3.4–10.8)

## 2025-01-10 PROCEDURE — 80061 LIPID PANEL: CPT | Performed by: NURSE PRACTITIONER

## 2025-01-10 PROCEDURE — 80050 GENERAL HEALTH PANEL: CPT | Performed by: NURSE PRACTITIONER

## 2025-01-10 PROCEDURE — 83036 HEMOGLOBIN GLYCOSYLATED A1C: CPT | Performed by: NURSE PRACTITIONER

## 2025-01-10 NOTE — PATIENT INSTRUCTIONS
Exercising to Lose Weight  Getting regular exercise is important for everyone. It is especially important if you are overweight. Being overweight increases your risk of heart disease, stroke, diabetes, high blood pressure, and several types of cancer. Exercising, and reducing the calories you consume, can help you lose weight and improve fitness and health.  Exercise can be moderate or vigorous intensity. To lose weight, most people need to do a certain amount of moderate or vigorous-intensity exercise each week.  How can exercise affect me?  You lose weight when you exercise enough to burn more calories than you eat. Exercise also reduces body fat and builds muscle. The more muscle you have, the more calories you burn. Exercise also:  Improves mood.  Reduces stress and tension.  Improves your overall fitness, flexibility, and endurance.  Increases bone strength.  Moderate-intensity exercise    Moderate-intensity exercise is any activity that gets you moving enough to burn at least three times more energy (calories) than if you were sitting.  Examples of moderate exercise include:  Walking a mile in 15 minutes.  Doing light yard work.  Biking at an easy pace.  Most people should get at least 150 minutes of moderate-intensity exercise a week to maintain their body weight.  Vigorous-intensity exercise  Vigorous-intensity exercise is any activity that gets you moving enough to burn at least six times more calories than if you were sitting. When you exercise at this intensity, you should be working hard enough that you are not able to carry on a conversation.  Examples of vigorous exercise include:  Running.  Playing a team sport, such as football, basketball, and soccer.  Jumping rope.  Most people should get at least 75 minutes a week of vigorous exercise to maintain their body weight.  What actions can I take to lose weight?  The amount of exercise you need to lose weight depends on:  Your age.  The type of  exercise.  Any health conditions you have.  Your overall physical ability.  Talk to your health care provider about how much exercise you need and what types of activities are safe for you.  Nutrition    Make changes to your diet as told by your health care provider or diet and nutrition specialist (dietitian). This may include:  Eating fewer calories.  Eating more protein.  Eating less unhealthy fats.  Eating a diet that includes fresh fruits and vegetables, whole grains, low-fat dairy products, and lean protein.  Avoiding foods with added fat, salt, and sugar.  Drink plenty of water while you exercise to prevent dehydration or heat stroke.  Activity  Choose an activity that you enjoy and set realistic goals. Your health care provider can help you make an exercise plan that works for you.  Exercise at a moderate or vigorous intensity most days of the week.  The intensity of exercise may vary from person to person. You can tell how intense a workout is for you by paying attention to your breathing and heartbeat. Most people will notice their breathing and heartbeat get faster with more intense exercise.  Do resistance training twice each week, such as:  Push-ups.  Sit-ups.  Lifting weights.  Using resistance bands.  Getting short amounts of exercise can be just as helpful as long, structured periods of exercise. If you have trouble finding time to exercise, try doing these things as part of your daily routine:  Get up, stretch, and walk around every 30 minutes throughout the day.  Go for a walk during your lunch break.  Park your car farther away from your destination.  If you take public transportation, get off one stop early and walk the rest of the way.  Make phone calls while standing up and walking around.  Take the stairs instead of elevators or escalators.  Wear comfortable clothes and shoes with good support.  Do not exercise so much that you hurt yourself, feel dizzy, or get very short of breath.  Where to  find more information  [place] Department of Health and Human Services: www.hhs.gov  Centers for Disease Control and Prevention: www.cdc.gov  Contact a health care provider:  Before starting a new exercise program.  If you have questions or concerns about your weight.  If you have a medical problem that keeps you from exercising.  Get help right away if:  You have any of the following while exercising:  Injury.  Dizziness.  Difficulty breathing or shortness of breath that does not go away when you stop exercising.  Chest pain.  Rapid heartbeat.  These symptoms may represent a serious problem that is an emergency. Do not wait to see if the symptoms will go away. Get medical help right away. Call your local emergency services (911 in the [place] Do not drive yourself to the hospital.  Summary  Getting regular exercise is especially important if you are overweight.  Being overweight increases your risk of heart disease, stroke, diabetes, high blood pressure, and several types of cancer.  Losing weight happens when you burn more calories than you eat.  Reducing the amount of calories you eat, and getting regular moderate or vigorous exercise each week, helps you lose weight.  This information is not intended to replace advice given to you by your health care provider. Make sure you discuss any questions you have with your health care provider.  Document Revised: 02/13/2022 Document Reviewed: 02/13/2022  [employer] Patient Education © 2024 [employer]  MyPlate from [employer]    MyPlate is an outline of a general healthy diet based on the Dietary Guidelines for Americans, 9672-9022, from the U.[place]. Department of Agriculture (USDA). It sets guidelines for how much food you should eat from each food group based on your age, sex, and level of physical activity.  What are tips for following MyPlate?  To follow MyPlate recommendations:  Eat a wide variety of fruits and vegetables, grains, and protein foods.  Serve smaller portions and  eat less food throughout the day.  Limit portion sizes to avoid overeating.  Enjoy your food.  Get at least 150 minutes of exercise every week. This is about 30 minutes each day, 5 or more days per week.  It can be difficult to have every meal look like MyPlate. Think about MyPlate as eating guidelines for an entire day, rather than each individual meal.  Fruits and vegetables  Make one half of your plate fruits and vegetables.  Eat many different colors of fruits and vegetables each day.  For a 2,000-calorie daily food plan, eat:  2½ cups of vegetables every day.  2 cups of fruit every day.  1 cup is equal to:  1 cup raw or cooked vegetables.  1 cup raw fruit.  1 medium-sized orange, apple, or banana.  1 cup 100% fruit or vegetable juice.  2 cups raw leafy greens, such as lettuce, spinach, or kale.  ½ cup dried fruit.  Grains  One fourth of your plate should be grains.  Make at least half of the grains you eat each day whole grains.  For a 2,000-calorie daily food plan, eat 6 oz of grains every day.  1 oz is equal to:  1 slice bread.  1 cup cereal.  ½ cup cooked rice, cereal, or pasta.  Protein  One fourth of your plate should be protein.  Eat a wide variety of protein foods, including meat, poultry, fish, eggs, beans, nuts, and tofu.  For a 2,000-calorie daily food plan, eat 5½ oz of protein every day.  1 oz is equal to:  1 oz meat, poultry, or fish.  ¼ cup cooked beans.  1 egg.  ½ oz nuts or seeds.  1 Tbsp peanut butter.  Dairy  Drink fat-free or low-fat (1%) milk.  Eat or drink dairy as a side to meals.  For a 2,000-calorie daily food plan, eat or drink 3 cups of dairy every day.  1 cup is equal to:  1 cup milk, yogurt, cottage cheese, or soy milk (soy beverage).  2 oz processed cheese.  1½ oz natural cheese.  Fats, oils, salt, and sugars  Only small amounts of oils are recommended.  Avoid foods that are high in calories and low in nutritional value (empty calories), like foods high in fat or added  sugars.  Choose foods that are low in salt (sodium). Choose foods that have less than 140 milligrams (mg) of sodium per serving.  Drink water instead of sugary drinks. Drink enough fluid to keep your urine pale yellow.  Where to find support  Work with your health care provider or a dietitian to develop a customized eating plan that is right for you.  Download an rosi (mobile application) to help you track your daily food intake.  Where to find more information  USDA: ChooseMyPlate.gov  Summary  MyPlate is a general guideline for healthy eating from the USDA. It is based on the Dietary Guidelines for Americans, 1777-6393.  In general, fruits and vegetables should take up one half of your plate, grains should take up one fourth of your plate, and protein should take up one fourth of your plate.  This information is not intended to replace advice given to you by your health care provider. Make sure you discuss any questions you have with your health care provider.  Document Revised: 11/08/2021 Document Reviewed: 11/08/2021  [employer] Patient Education © 2024 [employer]

## 2025-01-10 NOTE — PROGRESS NOTES
Female Physical Note      Patient Name: Faye Villarreal  : 1965   MRN: [id number]     Chief Complaint:    Chief Complaint   Patient presents with   • Annual Exam       History of Present Illness: Faye Villarreal is a 59 y.o. female who is here today for their annual health maintenance and physical.  History of Present Illness  The patient is a 59-year-old female who presents for evaluation of prediabetes, urinary incontinence, and health maintenance.    She has been diagnosed with prediabetes and is currently fasting. She has been making dietary modifications to manage this condition.    She reports occasional urinary incontinence, necessitating the use of sanitary pads. She does not experience any dysuria or alterations in bowel movements.    She has no history of abnormal Pap smears and retains her uterus. She has not experienced any abnormal uterine bleeding. Her last menstrual period was in 2022, marking the end of her menstrual cycle. She does not report any vaginal discharge or discomfort. She had a colonoscopy at the age of 51, which revealed no polyps, and was advised to repeat the procedure in 10 years. She is uncertain about her vitamin D levels. She is due for a mammogram. She has an appointment with a retina specialist on Tuesday. She has previously consulted a dermatologist but does not have any concerning skin lesions. She has multiple red dots on her skin, which have remained unchanged. She does not experience any chest pain, dyspnea, or palpitations.    Supplemental Information  She continues to take MiraLAX twice weekly for gastrointestinal issues. She had cataract surgery and was given fentanyl.    MEDICATIONS  MiraLAX      Subjective     Review of System: Review of Systems   A review of systems was performed, and the pertinent positives are noted in the HPI.      Past Medical History:   Past Medical History:   Diagnosis Date   • Allergic 2018    Took shots for 5 years   • Cataract      Had surgery 11-   • Cholelithiasis 2005    Removed gallbladder 2005   • Visual impairment 2024    Macular pucker surgery 7-8-2024       Past Surgical History:   Past Surgical History:   Procedure Laterality Date   • CHOLECYSTECTOMY     • COLONOSCOPY  2016    Found nothing.  Need a recheck in 10 years   • EYE SURGERY  11/18/2024       Family History:   Family History   Problem Relation Age of Onset   • Diabetes Mother    • Arthritis Mother    • Stroke Father    • Dementia Father    • Vision loss Father        Social History:   Social History     Socioeconomic History   • Marital status: Single   Tobacco Use   • Smoking status: Never     Passive exposure: Never   • Smokeless tobacco: Never   Vaping Use   • Vaping status: Never Used   Substance and Sexual Activity   • Alcohol use: Never   • Drug use: Never   • Sexual activity: Yes     Partners: Male     Birth control/protection: None       Medications:   No current outpatient medications on file.    Allergies:   Allergies   Allergen Reactions   • Bactrim [Sulfamethoxazole-Trimethoprim] Unknown - Low Severity   • Z-Jerome [Azithromycin] Nausea And Vomiting       Reviewed immunization history and updated state vaccination form as needed. Patient was counseled on COVID-19  Influenza  Zoster     PHQ-2 Depression Screening  Little interest or pleasure in doing things? Not at all   Feeling down, depressed, or hopeless? Not at all   PHQ-2 Total Score 0     Flu (Yearly): Advised    Colorectal Screening:     Last Completed Colonoscopy       This patient has no relevant Health Maintenance data.           Pap:    Last Completed Pap Smear       This patient has no relevant Health Maintenance data.           Mammogram:    Last Completed Mammogram       This patient has no relevant Health Maintenance data.            Physical Exam:  Vital Signs:   Vitals:    01/10/25 0807   BP: 118/82   BP Location: Right arm   Patient Position: Sitting   Cuff Size: Adult   Pulse: 82   Resp: 14  "  Temp: 97.3 °F (36.3 °C)   TempSrc: Infrared   Weight: 78.8 kg (173 lb 12.8 oz)   Height: 160 cm (63\")   PainSc: 0-No pain     Body mass index is 30.79 kg/m². BMI is >= 30 and <35. (Class 1 Obesity). The following options were offered after discussion;: weight loss educational material (shared in after visit summary) and information on healthy weight added to patient's after visit summary         Physical Exam  Vitals and nursing note reviewed.   Constitutional:       General: She is not in acute distress.     Appearance: Normal appearance. She is not ill-appearing.   HENT:      Head: Normocephalic.      Right Ear: Tympanic membrane, ear canal and external ear normal. There is no impacted cerumen.      Left Ear: Tympanic membrane, ear canal and external ear normal. There is no impacted cerumen.      Nose: No nasal tenderness or rhinorrhea.      Mouth/Throat:      Mouth: Mucous membranes are moist.      Pharynx: Oropharynx is clear. No oropharyngeal exudate or posterior oropharyngeal erythema.   Eyes:      General:         Right eye: No discharge.         Left eye: No discharge.      Extraocular Movements: Extraocular movements intact.      Conjunctiva/sclera: Conjunctivae normal.      Pupils: Pupils are equal, round, and reactive to light.   Neck:      Thyroid: No thyromegaly.      Vascular: No carotid bruit.   Cardiovascular:      Rate and Rhythm: Normal rate and regular rhythm.      Pulses: Normal pulses.      Heart sounds: Normal heart sounds. No murmur heard.     No gallop.   Pulmonary:      Effort: Pulmonary effort is normal.      Breath sounds: Normal breath sounds. No wheezing, rhonchi or rales.   Abdominal:      General: Bowel sounds are normal.      Palpations: Abdomen is soft. There is no mass.      Tenderness: There is no abdominal tenderness. There is no right CVA tenderness or left CVA tenderness.   Musculoskeletal:         General: No swelling or tenderness. Normal range of motion.      Cervical back: " Normal range of motion.      Right lower leg: No edema.      Left lower leg: No edema.   Lymphadenopathy:      Cervical: No cervical adenopathy.   Skin:     General: Skin is warm and dry.      Capillary Refill: Capillary refill takes less than 2 seconds.      Findings: No erythema or rash.      Comments: Multiple hemangiomas, moles; no suspicious skin lesions   Neurological:      General: No focal deficit present.      Mental Status: She is alert and oriented to person, place, and time.      Motor: No weakness.   Psychiatric:         Mood and Affect: Mood normal.         Behavior: Behavior is cooperative.         Cognition and Memory: She does not exhibit impaired recent memory.   Physical Exam      Procedures    Assessment / Plan      Assessment/Plan:   Diagnoses and all orders for this visit:    1. Encounter for wellness examination (Primary)  -     TSH Rfx On Abnormal To Free T4; Future  -     Comprehensive Metabolic Panel; Future  -     CBC & Differential; Future  -     TSH Rfx On Abnormal To Free T4  -     Comprehensive Metabolic Panel  -     CBC & Differential    2. Encounter for screening mammogram for malignant neoplasm of breast  -     Mammo Screening Digital Tomosynthesis Bilateral With CAD; Future    3. Prediabetes  -     Cancel: POC Glycosylated Hemoglobin (Hb A1C); Future  -     Hemoglobin A1c; Future  -     Hemoglobin A1c    4. Encounter for lipid screening for cardiovascular disease  -     Lipid Panel; Future  -     Lipid Panel    5. Encounter for cervical Pap smear with pelvic exam  -     LIQUID-BASED PAP SMEAR WITH HPV GENOTYPING REGARDLESS OF INTERPRETATION ([facility] EDUARDO,COR,MAD); Future  -     LIQUID-BASED PAP SMEAR WITH HPV GENOTYPING REGARDLESS OF INTERPRETATION ([facility] ED[facility]ARDO,COR,MAD)    Other orders  -     Cancel: Fluzone >6mos  -     Cancel: COVID-19 (Pfizer) 12yrs+ (COMIRNATY)       Assessment & Plan  1. Prediabetes.  Her A1c levels will be assessed to monitor her blood glucose levels over the past three  months.     Health maintenance.  She is due for a mammogram.  Anoscopy results have been requested.  Her H. pylori test was negative during the last visit. A Pap smear and HPV test will be conducted today. Her white blood cell count will be rechecked due to a previous high result. Additional tests will include cholesterol, thyroid, kidney, liver, and blood counts. She is advised to get the shingles vaccine at her pharmacy.    PROCEDURE  Colonoscopy at age 51 revealed no polyps.  Recommend seatbelt, sunscreen, helmet when necessary, smoke detectors and carbon monoxide detectors in the home.   Patient to schedule eye, dental exam if not up-to-date and dermatology is recommended.    Explained and discussed patient's condition and plan of care including labs and radiology that may be ordered.  Discussed when to follow-up.  Discussed possible red flags and how to follow-up with those.  Viewed patient's medications and discussed common side effects and symptoms to report. Patient to continue current medications as advised.  Be compliant with medications. Patient to call clinic if they have any worsening, no improvement, does not tolerate medication, or any future concerns about treatment.  Questions and concerns addressed at this appointment.  Patient to report to ER for emergencies.  Patient verbalized an understanding and agreement with plan of care.      Follow Up:   Return in about 1 year (around 1/10/2026) for Annual.    Patient or patient representative verbalized consent for the use of Ambient Listening during the visit with  SARAH Rodney for chart documentation. 1/12/2025  08:40 EST      Health Maintenance, Female  Adopting a healthy lifestyle and getting preventive care can go a long way to promote health and wellness. Talk with your health care provider about what schedule of regular examinations is right for you. This is a good chance for you to check in with your provider about disease prevention and  staying healthy.  In between checkups, there are plenty of things you can do on your own. Experts have done a lot of research about which lifestyle changes and preventive measures are most likely to keep you healthy. Ask your health care provider for more information.  Weight and diet  Eat a healthy diet  Be sure to include plenty of vegetables, fruits, low-fat dairy products, and lean protein.  Do not eat a lot of foods high in solid fats, added sugars, or salt.  Get regular exercise. This is one of the most important things you can do for your health.  Most adults should exercise for at least 150 minutes each week. The exercise should increase your heart rate and make you sweat (moderate-intensity exercise).  Most adults should also do strengthening exercises at least twice a week. This is in addition to the moderate-intensity exercise.     Maintain a healthy weight  Body mass index (BMI) is a measurement that can be used to identify possible weight problems. It estimates body fat based on height and weight. Your health care provider can help determine your BMI and help you achieve or maintain a healthy weight.  For females 20 years of age and older:  A BMI below 18.5 is considered underweight.  A BMI of 18.5 to 24.9 is normal.  A BMI of 25 to 29.9 is considered overweight.  A BMI of 30 and above is considered obese.     Watch levels of cholesterol and blood lipids  You should start having your blood tested for lipids and cholesterol at 20 years of age, then have this test every 5 years.  You may need to have your cholesterol levels checked more often if:  Your lipid or cholesterol levels are high.  You are older than 50 years of age.  You are at high risk for heart disease.     Cancer screening  Lung Cancer  Lung cancer screening is recommended for adults 55-80 years old who are at high risk for lung cancer because of a history of smoking.  A yearly low-dose CT scan of the lungs is recommended for people  who:  Currently smoke.  Have quit within the past 15 years.  Have at least a 30-pack-year history of smoking. A pack year is smoking an average of one pack of cigarettes a day for 1 year.  Yearly screening should continue until it has been 15 years since you quit.  Yearly screening should stop if you develop a health problem that would prevent you from having lung cancer treatment.     Breast Cancer  Practice breast self-awareness. This means understanding how your breasts normally appear and feel.  It also means doing regular breast self-exams. Let your health care provider know about any changes, no matter how small.  If you are in your 20s or 30s, you should have a clinical breast exam (CBE) by a health care provider every 1-3 years as part of a regular health exam.  If you are 40 or older, have a CBE every year. Also consider having a breast X-ray (mammogram) every year.  If you have a family history of breast cancer, talk to your health care provider about genetic screening.  If you are at high risk for breast cancer, talk to your health care provider about having an MRI and a mammogram every year.  Breast cancer gene (BRCA) assessment is recommended for women who have family members with BRCA-related cancers. BRCA-related cancers include:  Breast.  Ovarian.  Tubal.  Peritoneal cancers.  Results of the assessment will determine the need for genetic counseling and BRCA1 and BRCA2 testing.     Cervical Cancer  Your health care provider may recommend that you be screened regularly for cancer of the pelvic organs (ovaries, uterus, and vagina). This screening involves a pelvic examination, including checking for microscopic changes to the surface of your cervix (Pap test). You may be encouraged to have this screening done every 3 years, beginning at age 21.  For women ages 30-65, health care providers may recommend pelvic exams and Pap testing every 3 years, or they may recommend the Pap and pelvic exam, combined  with testing for human papilloma virus (HPV), every 5 years. Some types of HPV increase your risk of cervical cancer. Testing for HPV may also be done on women of any age with unclear Pap test results.  Other health care providers may not recommend any screening for nonpregnant women who are considered low risk for pelvic cancer and who do not have symptoms. Ask your health care provider if a screening pelvic exam is right for you.  If you have had past treatment for cervical cancer or a condition that could lead to cancer, you need Pap tests and screening for cancer for at least 20 years after your treatment. If Pap tests have been discontinued, your risk factors (such as having a new sexual partner) need to be reassessed to determine if screening should resume. Some women have medical problems that increase the chance of getting cervical cancer. In these cases, your health care provider may recommend more frequent screening and Pap tests.     Colorectal Cancer  This type of cancer can be detected and often prevented.  Routine colorectal cancer screening usually begins at 50 years of age and continues through 75 years of age.  Your health care provider may recommend screening at an earlier age if you have risk factors for colon cancer.  Your health care provider may also recommend using home test kits to check for hidden blood in the stool.  A small camera at the end of a tube can be used to examine your colon directly (sigmoidoscopy or colonoscopy). This is done to check for the earliest forms of colorectal cancer.  Routine screening usually begins at age 50.  Direct examination of the colon should be repeated every 5-10 years through 75 years of age. However, you may need to be screened more often if early forms of precancerous polyps or small growths are found.     Skin Cancer  Check your skin from head to toe regularly.  Tell your health care provider about any new moles or changes in moles, especially if there  is a change in a mole's shape or color.  Also tell your health care provider if you have a mole that is larger than the size of a pencil eraser.  Always use sunscreen. Apply sunscreen liberally and repeatedly throughout the day.  Protect yourself by wearing long sleeves, pants, a wide-brimmed hat, and sunglasses whenever you are outside.     Heart disease, diabetes, and high blood pressure  High blood pressure causes heart disease and increases the risk of stroke. High blood pressure is more likely to develop in:  People who have blood pressure in the high end of the normal range (130-139/85-89 mm Hg).  People who are overweight or obese.  People who are .  If you are 18-39 years of age, have your blood pressure checked every 3-5 years. If you are 40 years of age or older, have your blood pressure checked every year. You should have your blood pressure measured twice--once when you are at a hospital or clinic, and once when you are not at a hospital or clinic. Record the average of the two measurements. To check your blood pressure when you are not at a hospital or clinic, you can use:  An automated blood pressure machine at a pharmacy.  A home blood pressure monitor.  If you are between 55 years and 79 years old, ask your health care provider if you should take aspirin to prevent strokes.  Have regular diabetes screenings. This involves taking a blood sample to check your fasting blood sugar level.  If you are at a normal weight and have a low risk for diabetes, have this test once every three years after 45 years of age.  If you are overweight and have a high risk for diabetes, consider being tested at a younger age or more often.  Preventing infection  Hepatitis B  If you have a higher risk for hepatitis B, you should be screened for this virus. You are considered at high risk for hepatitis B if:  You were born in a country where hepatitis B is common. Ask your health care provider which countries  are considered high risk.  Your parents were born in a high-risk country, and you have not been immunized against hepatitis B (hepatitis B vaccine).  You have HIV or AIDS.  You use needles to inject street drugs.  You live with someone who has hepatitis B.  You have had sex with someone who has hepatitis B.  You get hemodialysis treatment.  You take certain medicines for conditions, including cancer, organ transplantation, and autoimmune conditions.     Hepatitis C  Blood testing is recommended for:  Everyone born from 1945 through 1965.  Anyone with known risk factors for hepatitis C.     Sexually transmitted infections (STIs)  You should be screened for sexually transmitted infections (STIs) including gonorrhea and chlamydia if:  You are sexually active and are younger than 24 years of age.  You are older than 24 years of age and your health care provider tells you that you are at risk for this type of infection.  Your sexual activity has changed since you were last screened and you are at an increased risk for chlamydia or gonorrhea. Ask your health care provider if you are at risk.  If you do not have HIV, but are at risk, it may be recommended that you take a prescription medicine daily to prevent HIV infection. This is called pre-exposure prophylaxis (PrEP). You are considered at risk if:  You are sexually active and do not regularly use condoms or know the HIV status of your partner(s).  You take drugs by injection.  You are sexually active with a partner who has HIV.     Talk with your health care provider about whether you are at high risk of being infected with HIV. If you choose to begin PrEP, you should first be tested for HIV. You should then be tested every 3 months for as long as you are taking PrEP.  Pregnancy  If you are premenopausal and you may become pregnant, ask your health care provider about preconception counseling.  If you may become pregnant, take 400 to 800 micrograms (mcg) of folic acid  every day.  If you want to prevent pregnancy, talk to your health care provider about birth control (contraception).  Osteoporosis and menopause  Osteoporosis is a disease in which the bones lose minerals and strength with aging. This can result in serious bone fractures. Your risk for osteoporosis can be identified using a bone density scan.  If you are 65 years of age or older, or if you are at risk for osteoporosis and fractures, ask your health care provider if you should be screened.  Ask your health care provider whether you should take a calcium or vitamin D supplement to lower your risk for osteoporosis.  Menopause may have certain physical symptoms and risks.  Hormone replacement therapy may reduce some of these symptoms and risks.  Talk to your health care provider about whether hormone replacement therapy is right for you.  Follow these instructions at home:  Schedule regular health, dental, and eye exams.  Stay current with your immunizations.  Do not use any tobacco products including cigarettes, chewing tobacco, or electronic cigarettes.  If you are pregnant, do not drink alcohol.  If you are breastfeeding, limit how much and how often you drink alcohol.  Limit alcohol intake to no more than 1 drink per day for nonpregnant women. One drink equals 12 ounces of beer, 5 ounces of wine, or 1½ ounces of hard liquor.  Do not use street drugs.  Do not share needles.  Ask your health care provider for help if you need support or information about quitting drugs.  Tell your health care provider if you often feel depressed.  Tell your health care provider if you have ever been abused or do not feel safe at home.  This information is not intended to replace advice given to you by your health care provider. Make sure you discuss any questions you have with your health care provider.  Document Released: 07/02/2012 Document Revised: 05/25/2017 Document Reviewed: 09/20/2016  [employer] Interactive Patient Education ©  2018 [employer]   Faye Villarreal voices understanding and acceptance of this advice and will call back with any further questions or concerns. AVS with preventive healthcare tips printed for patient.     SARAH Rodney  [facility]

## 2025-01-13 LAB — REF LAB TEST METHOD: NORMAL

## 2025-01-16 ENCOUNTER — HOSPITAL ENCOUNTER (OUTPATIENT)
Dept: MAMMOGRAPHY | Facility: HOSPITAL | Age: 60
Discharge: HOME | End: 2025-01-16
Admitting: NURSE PRACTITIONER
Payer: COMMERCIAL

## 2025-01-16 DIAGNOSIS — Z12.31 ENCOUNTER FOR SCREENING MAMMOGRAM FOR MALIGNANT NEOPLASM OF BREAST: ICD-10-CM

## 2025-01-16 LAB
NCCN CRITERIA FLAG: NORMAL
TYRER CUZICK SCORE: 6.9

## 2025-01-16 PROCEDURE — 77067 SCR MAMMO BI INCL CAD: CPT

## 2025-01-16 PROCEDURE — 77063 BREAST TOMOSYNTHESIS BI: CPT
